# Patient Record
Sex: MALE | Race: WHITE | NOT HISPANIC OR LATINO | Employment: FULL TIME | URBAN - METROPOLITAN AREA
[De-identification: names, ages, dates, MRNs, and addresses within clinical notes are randomized per-mention and may not be internally consistent; named-entity substitution may affect disease eponyms.]

---

## 2017-03-07 ENCOUNTER — TELEPHONE (OUTPATIENT)
Dept: CARDIOLOGY | Facility: CLINIC | Age: 52
End: 2017-03-07

## 2018-03-27 ENCOUNTER — DOCUMENTATION (OUTPATIENT)
Dept: FAMILY MEDICINE CLINIC | Facility: CLINIC | Age: 53
End: 2018-03-27

## 2018-10-02 ENCOUNTER — CLINICAL SUPPORT (OUTPATIENT)
Dept: FAMILY MEDICINE CLINIC | Facility: CLINIC | Age: 53
End: 2018-10-02

## 2018-10-02 DIAGNOSIS — Z23 FLU VACCINE NEED: Primary | ICD-10-CM

## 2018-10-02 DIAGNOSIS — Z23 IMMUNIZATION DUE: ICD-10-CM

## 2018-10-02 PROCEDURE — 90471 IMMUNIZATION ADMIN: CPT | Performed by: FAMILY MEDICINE

## 2018-10-02 PROCEDURE — 90686 IIV4 VACC NO PRSV 0.5 ML IM: CPT | Performed by: FAMILY MEDICINE

## 2018-10-10 ENCOUNTER — TELEPHONE (OUTPATIENT)
Dept: GASTROENTEROLOGY | Facility: CLINIC | Age: 53
End: 2018-10-10

## 2018-10-10 NOTE — TELEPHONE ENCOUNTER
Memo, MD Dez See Carla C Dr Marquess needs his screening colonosocopy and wants it on 11/5/18 a mon i've already got 7 colons on but would like to squeeze him in . Would you contact EP and see if I can start at 7:30?   Looks like all of  info is up to date form this year. Just let me know and feel free to reach out to Dr Gan.

## 2018-10-10 NOTE — TELEPHONE ENCOUNTER
SCHEDULED AT [place] ON 11/05/2018 AT 7:30AM - ARRIVE 6:30AM.  EMAILED INSTRUCTIONS [email or web address] TODAY.

## 2018-10-31 ENCOUNTER — PREP FOR SURGERY (OUTPATIENT)
Dept: OTHER | Facility: HOSPITAL | Age: 53
End: 2018-10-31

## 2018-10-31 ENCOUNTER — DOCUMENTATION (OUTPATIENT)
Dept: FAMILY MEDICINE CLINIC | Facility: CLINIC | Age: 53
End: 2018-10-31

## 2018-10-31 DIAGNOSIS — D12.6 ADENOMATOUS POLYP OF COLON, UNSPECIFIED PART OF COLON: Primary | ICD-10-CM

## 2018-11-05 ENCOUNTER — OUTSIDE FACILITY SERVICE (OUTPATIENT)
Dept: GASTROENTEROLOGY | Facility: CLINIC | Age: 53
End: 2018-11-05

## 2018-11-05 PROCEDURE — 45380 COLONOSCOPY AND BIOPSY: CPT | Performed by: INTERNAL MEDICINE

## 2018-11-05 PROCEDURE — 88305 TISSUE EXAM BY PATHOLOGIST: CPT | Performed by: INTERNAL MEDICINE

## 2018-11-06 ENCOUNTER — LAB REQUISITION (OUTPATIENT)
Dept: LAB | Facility: HOSPITAL | Age: 53
End: 2018-11-06

## 2018-11-06 DIAGNOSIS — D12.6 BENIGN NEOPLASM OF COLON: ICD-10-CM

## 2018-11-07 LAB
CYTO UR: NORMAL
LAB AP CASE REPORT: NORMAL
LAB AP CLINICAL INFORMATION: NORMAL
PATH REPORT.FINAL DX SPEC: NORMAL
PATH REPORT.GROSS SPEC: NORMAL

## 2019-07-20 ENCOUNTER — HOSPITAL ENCOUNTER (INPATIENT)
Facility: HOSPITAL | Age: 54
LOS: 1 days | Discharge: HOME | End: 2019-07-22
Attending: EMERGENCY MEDICINE | Admitting: SURGERY

## 2019-07-20 ENCOUNTER — APPOINTMENT (OUTPATIENT)
Dept: GENERAL RADIOLOGY | Facility: HOSPITAL | Age: 54
End: 2019-07-20

## 2019-07-20 ENCOUNTER — APPOINTMENT (OUTPATIENT)
Dept: ULTRASOUND IMAGING | Facility: HOSPITAL | Age: 54
End: 2019-07-20

## 2019-07-20 ENCOUNTER — APPOINTMENT (OUTPATIENT)
Dept: CT IMAGING | Facility: HOSPITAL | Age: 54
End: 2019-07-20

## 2019-07-20 DIAGNOSIS — K81.9 CHOLECYSTITIS: Primary | ICD-10-CM

## 2019-07-20 DIAGNOSIS — K80.20 CHOLELITHIASIS: ICD-10-CM

## 2019-07-20 LAB
ALBUMIN SERPL-MCNC: 4.5 G/DL (ref 3.5–5.2)
ALBUMIN/GLOB SERPL: 2 G/DL
ALP SERPL-CCNC: 66 U/L (ref 39–117)
ALT SERPL W P-5'-P-CCNC: 16 U/L (ref 1–41)
ANION GAP SERPL CALCULATED.3IONS-SCNC: 11.3 MMOL/L (ref 5–15)
AST SERPL-CCNC: 18 U/L (ref 1–40)
BASOPHILS # BLD AUTO: 0.04 10*3/MM3 (ref 0–0.2)
BASOPHILS NFR BLD AUTO: 0.4 % (ref 0–1.5)
BILIRUB SERPL-MCNC: 0.6 MG/DL (ref 0.2–1.2)
BUN BLD-MCNC: 12 MG/DL (ref 6–20)
BUN/CREAT SERPL: 8.9 (ref 7–25)
CALCIUM SPEC-SCNC: 9.1 MG/DL (ref 8.6–10.5)
CHLORIDE SERPL-SCNC: 100 MMOL/L (ref 98–107)
CO2 SERPL-SCNC: 26.7 MMOL/L (ref 22–29)
CREAT BLD-MCNC: 1.35 MG/DL (ref 0.76–1.27)
DEPRECATED RDW RBC AUTO: 37.6 FL (ref 37–54)
EOSINOPHIL # BLD AUTO: 0.16 10*3/MM3 (ref 0–0.4)
EOSINOPHIL NFR BLD AUTO: 1.7 % (ref 0.3–6.2)
ERYTHROCYTE [DISTWIDTH] IN BLOOD BY AUTOMATED COUNT: 12 % (ref 12.3–15.4)
GFR SERPL CREATININE-BSD FRML MDRD: 55 ML/MIN/1.73
GLOBULIN UR ELPH-MCNC: 2.3 GM/DL
GLUCOSE BLD-MCNC: 146 MG/DL (ref 65–99)
HCT VFR BLD AUTO: 44.3 % (ref 37.5–51)
HGB BLD-MCNC: 15.4 G/DL (ref 13–17.7)
IMM GRANULOCYTES # BLD AUTO: 0.02 10*3/MM3 (ref 0–0.05)
IMM GRANULOCYTES NFR BLD AUTO: 0.2 % (ref 0–0.5)
INR PPP: 1.05 (ref 0.9–1.1)
LIPASE SERPL-CCNC: 30 U/L (ref 13–60)
LYMPHOCYTES # BLD AUTO: 3.63 10*3/MM3 (ref 0.7–3.1)
LYMPHOCYTES NFR BLD AUTO: 38.3 % (ref 19.6–45.3)
MAGNESIUM SERPL-MCNC: 2.3 MG/DL (ref 1.6–2.6)
MCH RBC QN AUTO: 29.7 PG (ref 26.6–33)
MCHC RBC AUTO-ENTMCNC: 34.8 G/DL (ref 31.5–35.7)
MCV RBC AUTO: 85.5 FL (ref 79–97)
MONOCYTES # BLD AUTO: 0.7 10*3/MM3 (ref 0.1–0.9)
MONOCYTES NFR BLD AUTO: 7.4 % (ref 5–12)
NEUTROPHILS # BLD AUTO: 4.93 10*3/MM3 (ref 1.7–7)
NEUTROPHILS NFR BLD AUTO: 52 % (ref 42.7–76)
NRBC BLD AUTO-RTO: 0 /100 WBC (ref 0–0.2)
PLATELET # BLD AUTO: 206 10*3/MM3 (ref 140–450)
PMV BLD AUTO: 9.3 FL (ref 6–12)
POTASSIUM BLD-SCNC: 3.5 MMOL/L (ref 3.5–5.2)
PROT SERPL-MCNC: 6.8 G/DL (ref 6–8.5)
PROTHROMBIN TIME: 13.4 SECONDS (ref 11.7–14.2)
RBC # BLD AUTO: 5.18 10*6/MM3 (ref 4.14–5.8)
SODIUM BLD-SCNC: 138 MMOL/L (ref 136–145)
TROPONIN T SERPL-MCNC: <0.01 NG/ML (ref 0–0.03)
WBC NRBC COR # BLD: 9.48 10*3/MM3 (ref 3.4–10.8)

## 2019-07-20 PROCEDURE — 84484 ASSAY OF TROPONIN QUANT: CPT | Performed by: EMERGENCY MEDICINE

## 2019-07-20 PROCEDURE — 83690 ASSAY OF LIPASE: CPT | Performed by: EMERGENCY MEDICINE

## 2019-07-20 PROCEDURE — 83735 ASSAY OF MAGNESIUM: CPT | Performed by: EMERGENCY MEDICINE

## 2019-07-20 PROCEDURE — 80053 COMPREHEN METABOLIC PANEL: CPT | Performed by: EMERGENCY MEDICINE

## 2019-07-20 PROCEDURE — 25010000002 ONDANSETRON PER 1 MG: Performed by: EMERGENCY MEDICINE

## 2019-07-20 PROCEDURE — 99284 EMERGENCY DEPT VISIT MOD MDM: CPT

## 2019-07-20 PROCEDURE — 25010000002 HYDROMORPHONE 1 MG/ML SOLUTION: Performed by: EMERGENCY MEDICINE

## 2019-07-20 PROCEDURE — 93005 ELECTROCARDIOGRAM TRACING: CPT | Performed by: EMERGENCY MEDICINE

## 2019-07-20 PROCEDURE — 71045 X-RAY EXAM CHEST 1 VIEW: CPT

## 2019-07-20 PROCEDURE — 85025 COMPLETE CBC W/AUTO DIFF WBC: CPT | Performed by: EMERGENCY MEDICINE

## 2019-07-20 PROCEDURE — 25010000002 PIPERACILLIN SOD-TAZOBACTAM PER 1 G: Performed by: EMERGENCY MEDICINE

## 2019-07-20 PROCEDURE — 0 IOPAMIDOL PER 1 ML: Performed by: EMERGENCY MEDICINE

## 2019-07-20 PROCEDURE — 74177 CT ABD & PELVIS W/CONTRAST: CPT

## 2019-07-20 PROCEDURE — 93005 ELECTROCARDIOGRAM TRACING: CPT

## 2019-07-20 PROCEDURE — 71275 CT ANGIOGRAPHY CHEST: CPT

## 2019-07-20 PROCEDURE — 25010000002 HYDROMORPHONE PER 4 MG: Performed by: EMERGENCY MEDICINE

## 2019-07-20 PROCEDURE — 93010 ELECTROCARDIOGRAM REPORT: CPT | Performed by: INTERNAL MEDICINE

## 2019-07-20 PROCEDURE — 85610 PROTHROMBIN TIME: CPT | Performed by: EMERGENCY MEDICINE

## 2019-07-20 PROCEDURE — 76705 ECHO EXAM OF ABDOMEN: CPT

## 2019-07-20 RX ORDER — HYDROMORPHONE HYDROCHLORIDE 1 MG/ML
0.5 INJECTION, SOLUTION INTRAMUSCULAR; INTRAVENOUS; SUBCUTANEOUS ONCE
Status: COMPLETED | OUTPATIENT
Start: 2019-07-20 | End: 2019-07-20

## 2019-07-20 RX ORDER — ALUMINA, MAGNESIA, AND SIMETHICONE 2400; 2400; 240 MG/30ML; MG/30ML; MG/30ML
15 SUSPENSION ORAL ONCE
Status: COMPLETED | OUTPATIENT
Start: 2019-07-20 | End: 2019-07-20

## 2019-07-20 RX ORDER — LIDOCAINE HYDROCHLORIDE 20 MG/ML
15 SOLUTION OROPHARYNGEAL ONCE
Status: COMPLETED | OUTPATIENT
Start: 2019-07-20 | End: 2019-07-20

## 2019-07-20 RX ORDER — FAMOTIDINE 10 MG/ML
20 INJECTION, SOLUTION INTRAVENOUS ONCE
Status: COMPLETED | OUTPATIENT
Start: 2019-07-20 | End: 2019-07-20

## 2019-07-20 RX ORDER — SODIUM CHLORIDE 0.9 % (FLUSH) 0.9 %
10 SYRINGE (ML) INJECTION AS NEEDED
Status: DISCONTINUED | OUTPATIENT
Start: 2019-07-20 | End: 2019-07-22 | Stop reason: HOSPADM

## 2019-07-20 RX ORDER — ONDANSETRON 2 MG/ML
4 INJECTION INTRAMUSCULAR; INTRAVENOUS ONCE
Status: COMPLETED | OUTPATIENT
Start: 2019-07-20 | End: 2019-07-20

## 2019-07-20 RX ADMIN — TAZOBACTAM SODIUM AND PIPERACILLIN SODIUM 3.38 G: 375; 3 INJECTION, SOLUTION INTRAVENOUS at 23:54

## 2019-07-20 RX ADMIN — HYDROMORPHONE HYDROCHLORIDE 1 MG: 1 INJECTION, SOLUTION INTRAMUSCULAR; INTRAVENOUS; SUBCUTANEOUS at 23:50

## 2019-07-20 RX ADMIN — SODIUM CHLORIDE 1000 ML: 9 INJECTION, SOLUTION INTRAVENOUS at 21:57

## 2019-07-20 RX ADMIN — LIDOCAINE HYDROCHLORIDE 15 ML: 20 SOLUTION ORAL; TOPICAL at 21:58

## 2019-07-20 RX ADMIN — FAMOTIDINE 20 MG: 10 INJECTION INTRAVENOUS at 21:57

## 2019-07-20 RX ADMIN — IOPAMIDOL 95 ML: 755 INJECTION, SOLUTION INTRAVENOUS at 22:45

## 2019-07-20 RX ADMIN — ALUMINUM HYDROXIDE, MAGNESIUM HYDROXIDE, AND DIMETHICONE 15 ML: 400; 400; 40 SUSPENSION ORAL at 21:58

## 2019-07-20 RX ADMIN — ONDANSETRON HYDROCHLORIDE 4 MG: 2 SOLUTION INTRAMUSCULAR; INTRAVENOUS at 22:26

## 2019-07-20 RX ADMIN — HYDROMORPHONE HYDROCHLORIDE 0.5 MG: 1 INJECTION, SOLUTION INTRAMUSCULAR; INTRAVENOUS; SUBCUTANEOUS at 22:26

## 2019-07-21 ENCOUNTER — APPOINTMENT (OUTPATIENT)
Dept: GENERAL RADIOLOGY | Facility: HOSPITAL | Age: 54
End: 2019-07-21

## 2019-07-21 ENCOUNTER — ANESTHESIA EVENT (OUTPATIENT)
Dept: PERIOP | Facility: HOSPITAL | Age: 54
End: 2019-07-21

## 2019-07-21 ENCOUNTER — ANESTHESIA (OUTPATIENT)
Dept: PERIOP | Facility: HOSPITAL | Age: 54
End: 2019-07-21

## 2019-07-21 PROBLEM — K81.9 CHOLECYSTITIS: Status: ACTIVE | Noted: 2019-07-21

## 2019-07-21 PROBLEM — K81.9 CHOLECYSTITIS: Status: RESOLVED | Noted: 2019-07-21 | Resolved: 2019-07-21

## 2019-07-21 LAB
ALBUMIN SERPL-MCNC: 4.1 G/DL (ref 3.5–5.2)
ALBUMIN/GLOB SERPL: 1.7 G/DL
ALP SERPL-CCNC: 60 U/L (ref 39–117)
ALT SERPL W P-5'-P-CCNC: 17 U/L (ref 1–41)
ANION GAP SERPL CALCULATED.3IONS-SCNC: 9.8 MMOL/L (ref 5–15)
AST SERPL-CCNC: 16 U/L (ref 1–40)
BASOPHILS # BLD AUTO: 0.01 10*3/MM3 (ref 0–0.2)
BASOPHILS NFR BLD AUTO: 0.1 % (ref 0–1.5)
BILIRUB SERPL-MCNC: 0.6 MG/DL (ref 0.2–1.2)
BILIRUB UR QL STRIP: NEGATIVE
BUN BLD-MCNC: 12 MG/DL (ref 6–20)
BUN/CREAT SERPL: 10.6 (ref 7–25)
CALCIUM SPEC-SCNC: 9.2 MG/DL (ref 8.6–10.5)
CHLORIDE SERPL-SCNC: 107 MMOL/L (ref 98–107)
CLARITY UR: CLEAR
CO2 SERPL-SCNC: 25.2 MMOL/L (ref 22–29)
COLOR UR: YELLOW
CREAT BLD-MCNC: 1.13 MG/DL (ref 0.76–1.27)
DEPRECATED RDW RBC AUTO: 37.7 FL (ref 37–54)
EOSINOPHIL # BLD AUTO: 0 10*3/MM3 (ref 0–0.4)
EOSINOPHIL NFR BLD AUTO: 0 % (ref 0.3–6.2)
ERYTHROCYTE [DISTWIDTH] IN BLOOD BY AUTOMATED COUNT: 11.8 % (ref 12.3–15.4)
GFR SERPL CREATININE-BSD FRML MDRD: 67 ML/MIN/1.73
GLOBULIN UR ELPH-MCNC: 2.4 GM/DL
GLUCOSE BLD-MCNC: 153 MG/DL (ref 65–99)
GLUCOSE UR STRIP-MCNC: NEGATIVE MG/DL
HCT VFR BLD AUTO: 41.9 % (ref 37.5–51)
HGB BLD-MCNC: 14.1 G/DL (ref 13–17.7)
HGB UR QL STRIP.AUTO: NEGATIVE
IMM GRANULOCYTES # BLD AUTO: 0.02 10*3/MM3 (ref 0–0.05)
IMM GRANULOCYTES NFR BLD AUTO: 0.2 % (ref 0–0.5)
KETONES UR QL STRIP: NEGATIVE
LEUKOCYTE ESTERASE UR QL STRIP.AUTO: NEGATIVE
LYMPHOCYTES # BLD AUTO: 0.76 10*3/MM3 (ref 0.7–3.1)
LYMPHOCYTES NFR BLD AUTO: 8.4 % (ref 19.6–45.3)
MCH RBC QN AUTO: 29.4 PG (ref 26.6–33)
MCHC RBC AUTO-ENTMCNC: 33.7 G/DL (ref 31.5–35.7)
MCV RBC AUTO: 87.5 FL (ref 79–97)
MONOCYTES # BLD AUTO: 0.41 10*3/MM3 (ref 0.1–0.9)
MONOCYTES NFR BLD AUTO: 4.6 % (ref 5–12)
NEUTROPHILS # BLD AUTO: 7.8 10*3/MM3 (ref 1.7–7)
NEUTROPHILS NFR BLD AUTO: 86.7 % (ref 42.7–76)
NITRITE UR QL STRIP: NEGATIVE
NRBC BLD AUTO-RTO: 0 /100 WBC (ref 0–0.2)
PH UR STRIP.AUTO: 8 [PH] (ref 5–8)
PLATELET # BLD AUTO: 163 10*3/MM3 (ref 140–450)
PMV BLD AUTO: 9.7 FL (ref 6–12)
POTASSIUM BLD-SCNC: 5 MMOL/L (ref 3.5–5.2)
PROT SERPL-MCNC: 6.5 G/DL (ref 6–8.5)
PROT UR QL STRIP: NEGATIVE
RBC # BLD AUTO: 4.79 10*6/MM3 (ref 4.14–5.8)
SODIUM BLD-SCNC: 142 MMOL/L (ref 136–145)
SP GR UR STRIP: >=1.03 (ref 1–1.03)
UROBILINOGEN UR QL STRIP: NORMAL
WBC NRBC COR # BLD: 9 10*3/MM3 (ref 3.4–10.8)

## 2019-07-21 PROCEDURE — 74300 X-RAY BILE DUCTS/PANCREAS: CPT

## 2019-07-21 PROCEDURE — 85025 COMPLETE CBC W/AUTO DIFF WBC: CPT | Performed by: SURGERY

## 2019-07-21 PROCEDURE — 25010000002 FENTANYL CITRATE (PF) 100 MCG/2ML SOLUTION: Performed by: ANESTHESIOLOGY

## 2019-07-21 PROCEDURE — 25010000002 SUCCINYLCHOLINE PER 20 MG: Performed by: ANESTHESIOLOGY

## 2019-07-21 PROCEDURE — 47563 LAPARO CHOLECYSTECTOMY/GRAPH: CPT | Performed by: PHYSICIAN ASSISTANT

## 2019-07-21 PROCEDURE — 25010000002 PROPOFOL 10 MG/ML EMULSION: Performed by: ANESTHESIOLOGY

## 2019-07-21 PROCEDURE — 47563 LAPARO CHOLECYSTECTOMY/GRAPH: CPT | Performed by: SURGERY

## 2019-07-21 PROCEDURE — BF101ZZ FLUOROSCOPY OF BILE DUCTS USING LOW OSMOLAR CONTRAST: ICD-10-PCS | Performed by: SURGERY

## 2019-07-21 PROCEDURE — 25010000002 ONDANSETRON PER 1 MG

## 2019-07-21 PROCEDURE — 25010000002 PIPERACILLIN SOD-TAZOBACTAM PER 1 G: Performed by: SURGERY

## 2019-07-21 PROCEDURE — 88304 TISSUE EXAM BY PATHOLOGIST: CPT | Performed by: SURGERY

## 2019-07-21 PROCEDURE — 25010000002 PROMETHAZINE PER 50 MG: Performed by: SURGERY

## 2019-07-21 PROCEDURE — 0FT44ZZ RESECTION OF GALLBLADDER, PERCUTANEOUS ENDOSCOPIC APPROACH: ICD-10-PCS | Performed by: SURGERY

## 2019-07-21 PROCEDURE — 80053 COMPREHEN METABOLIC PANEL: CPT | Performed by: SURGERY

## 2019-07-21 PROCEDURE — 25010000002 MIDAZOLAM PER 1 MG: Performed by: ANESTHESIOLOGY

## 2019-07-21 PROCEDURE — 81003 URINALYSIS AUTO W/O SCOPE: CPT | Performed by: EMERGENCY MEDICINE

## 2019-07-21 PROCEDURE — 99222 1ST HOSP IP/OBS MODERATE 55: CPT | Performed by: SURGERY

## 2019-07-21 DEVICE — CLIP LIGAT VASC HORIZON TI MD/LG GRN 6CT: Type: IMPLANTABLE DEVICE | Site: ABDOMEN | Status: FUNCTIONAL

## 2019-07-21 DEVICE — CLIP LIG HEMOLOK PA 6CT MD/LG GRN: Type: IMPLANTABLE DEVICE | Site: ABDOMEN | Status: FUNCTIONAL

## 2019-07-21 RX ORDER — DIPHENHYDRAMINE HYDROCHLORIDE 50 MG/ML
12.5 INJECTION INTRAMUSCULAR; INTRAVENOUS
Status: DISCONTINUED | OUTPATIENT
Start: 2019-07-21 | End: 2019-07-21 | Stop reason: HOSPADM

## 2019-07-21 RX ORDER — LABETALOL HYDROCHLORIDE 5 MG/ML
5 INJECTION, SOLUTION INTRAVENOUS
Status: DISCONTINUED | OUTPATIENT
Start: 2019-07-21 | End: 2019-07-21 | Stop reason: HOSPADM

## 2019-07-21 RX ORDER — MIDAZOLAM HYDROCHLORIDE 1 MG/ML
INJECTION INTRAMUSCULAR; INTRAVENOUS
Status: COMPLETED
Start: 2019-07-21 | End: 2019-07-21

## 2019-07-21 RX ORDER — NALOXONE HCL 0.4 MG/ML
0.2 VIAL (ML) INJECTION AS NEEDED
Status: DISCONTINUED | OUTPATIENT
Start: 2019-07-21 | End: 2019-07-21 | Stop reason: HOSPADM

## 2019-07-21 RX ORDER — GABAPENTIN 300 MG/1
600 CAPSULE ORAL ONCE
Status: COMPLETED | OUTPATIENT
Start: 2019-07-21 | End: 2019-07-21

## 2019-07-21 RX ORDER — PROPOFOL 10 MG/ML
VIAL (ML) INTRAVENOUS AS NEEDED
Status: DISCONTINUED | OUTPATIENT
Start: 2019-07-21 | End: 2019-07-21 | Stop reason: SURG

## 2019-07-21 RX ORDER — SODIUM CHLORIDE, SODIUM LACTATE, POTASSIUM CHLORIDE, CALCIUM CHLORIDE 600; 310; 30; 20 MG/100ML; MG/100ML; MG/100ML; MG/100ML
9 INJECTION, SOLUTION INTRAVENOUS CONTINUOUS
Status: DISCONTINUED | OUTPATIENT
Start: 2019-07-21 | End: 2019-07-22 | Stop reason: HOSPADM

## 2019-07-21 RX ORDER — HYDROCODONE BITARTRATE AND ACETAMINOPHEN 7.5; 325 MG/1; MG/1
1 TABLET ORAL ONCE AS NEEDED
Status: DISCONTINUED | OUTPATIENT
Start: 2019-07-21 | End: 2019-07-21 | Stop reason: HOSPADM

## 2019-07-21 RX ORDER — PROMETHAZINE HYDROCHLORIDE 25 MG/ML
6.25 INJECTION, SOLUTION INTRAMUSCULAR; INTRAVENOUS
Status: DISCONTINUED | OUTPATIENT
Start: 2019-07-21 | End: 2019-07-21 | Stop reason: HOSPADM

## 2019-07-21 RX ORDER — PROMETHAZINE HYDROCHLORIDE 25 MG/ML
12.5 INJECTION, SOLUTION INTRAMUSCULAR; INTRAVENOUS EVERY 6 HOURS PRN
Status: DISCONTINUED | OUTPATIENT
Start: 2019-07-21 | End: 2019-07-22 | Stop reason: HOSPADM

## 2019-07-21 RX ORDER — MIDAZOLAM HYDROCHLORIDE 1 MG/ML
1 INJECTION INTRAMUSCULAR; INTRAVENOUS
Status: DISCONTINUED | OUTPATIENT
Start: 2019-07-21 | End: 2019-07-21 | Stop reason: HOSPADM

## 2019-07-21 RX ORDER — FENTANYL CITRATE 50 UG/ML
50 INJECTION, SOLUTION INTRAMUSCULAR; INTRAVENOUS
Status: DISCONTINUED | OUTPATIENT
Start: 2019-07-21 | End: 2019-07-21 | Stop reason: HOSPADM

## 2019-07-21 RX ORDER — SODIUM CHLORIDE 9 MG/ML
INJECTION, SOLUTION INTRAVENOUS AS NEEDED
Status: DISCONTINUED | OUTPATIENT
Start: 2019-07-21 | End: 2019-07-21 | Stop reason: HOSPADM

## 2019-07-21 RX ORDER — PROMETHAZINE HYDROCHLORIDE 25 MG/1
25 TABLET ORAL ONCE AS NEEDED
Status: DISCONTINUED | OUTPATIENT
Start: 2019-07-21 | End: 2019-07-21 | Stop reason: HOSPADM

## 2019-07-21 RX ORDER — ONDANSETRON 2 MG/ML
4 INJECTION INTRAMUSCULAR; INTRAVENOUS ONCE
Status: COMPLETED | OUTPATIENT
Start: 2019-07-21 | End: 2019-07-21

## 2019-07-21 RX ORDER — NALOXONE HCL 0.4 MG/ML
0.4 VIAL (ML) INJECTION
Status: DISCONTINUED | OUTPATIENT
Start: 2019-07-21 | End: 2019-07-22 | Stop reason: HOSPADM

## 2019-07-21 RX ORDER — FAMOTIDINE 10 MG/ML
20 INJECTION, SOLUTION INTRAVENOUS ONCE
Status: COMPLETED | OUTPATIENT
Start: 2019-07-21 | End: 2019-07-21

## 2019-07-21 RX ORDER — ONDANSETRON 2 MG/ML
4 INJECTION INTRAMUSCULAR; INTRAVENOUS ONCE AS NEEDED
Status: DISCONTINUED | OUTPATIENT
Start: 2019-07-21 | End: 2019-07-21 | Stop reason: HOSPADM

## 2019-07-21 RX ORDER — PROMETHAZINE HYDROCHLORIDE 12.5 MG/1
12.5 TABLET ORAL EVERY 6 HOURS PRN
Status: DISCONTINUED | OUTPATIENT
Start: 2019-07-21 | End: 2019-07-22 | Stop reason: HOSPADM

## 2019-07-21 RX ORDER — DIPHENHYDRAMINE HCL 25 MG
25 CAPSULE ORAL
Status: DISCONTINUED | OUTPATIENT
Start: 2019-07-21 | End: 2019-07-21 | Stop reason: HOSPADM

## 2019-07-21 RX ORDER — ACETAMINOPHEN 500 MG
1000 TABLET ORAL ONCE
Status: COMPLETED | OUTPATIENT
Start: 2019-07-21 | End: 2019-07-21

## 2019-07-21 RX ORDER — FENTANYL CITRATE 50 UG/ML
INJECTION, SOLUTION INTRAMUSCULAR; INTRAVENOUS AS NEEDED
Status: DISCONTINUED | OUTPATIENT
Start: 2019-07-21 | End: 2019-07-21 | Stop reason: SURG

## 2019-07-21 RX ORDER — LIDOCAINE HYDROCHLORIDE 10 MG/ML
0.5 INJECTION, SOLUTION EPIDURAL; INFILTRATION; INTRACAUDAL; PERINEURAL ONCE AS NEEDED
Status: DISCONTINUED | OUTPATIENT
Start: 2019-07-21 | End: 2019-07-21 | Stop reason: HOSPADM

## 2019-07-21 RX ORDER — PROMETHAZINE HYDROCHLORIDE 25 MG/1
25 SUPPOSITORY RECTAL ONCE AS NEEDED
Status: DISCONTINUED | OUTPATIENT
Start: 2019-07-21 | End: 2019-07-21 | Stop reason: HOSPADM

## 2019-07-21 RX ORDER — SODIUM CHLORIDE 0.9 % (FLUSH) 0.9 %
3-10 SYRINGE (ML) INJECTION AS NEEDED
Status: DISCONTINUED | OUTPATIENT
Start: 2019-07-21 | End: 2019-07-22 | Stop reason: HOSPADM

## 2019-07-21 RX ORDER — SODIUM CHLORIDE 0.9 % (FLUSH) 0.9 %
1-10 SYRINGE (ML) INJECTION AS NEEDED
Status: DISCONTINUED | OUTPATIENT
Start: 2019-07-21 | End: 2019-07-21 | Stop reason: HOSPADM

## 2019-07-21 RX ORDER — PROMETHAZINE HYDROCHLORIDE 25 MG/ML
12.5 INJECTION, SOLUTION INTRAMUSCULAR; INTRAVENOUS ONCE AS NEEDED
Status: DISCONTINUED | OUTPATIENT
Start: 2019-07-21 | End: 2019-07-21 | Stop reason: HOSPADM

## 2019-07-21 RX ORDER — BUPIVACAINE HYDROCHLORIDE AND EPINEPHRINE 5; 5 MG/ML; UG/ML
INJECTION, SOLUTION PERINEURAL AS NEEDED
Status: DISCONTINUED | OUTPATIENT
Start: 2019-07-21 | End: 2019-07-21 | Stop reason: HOSPADM

## 2019-07-21 RX ORDER — SODIUM CHLORIDE 0.9 % (FLUSH) 0.9 %
3 SYRINGE (ML) INJECTION EVERY 12 HOURS SCHEDULED
Status: DISCONTINUED | OUTPATIENT
Start: 2019-07-21 | End: 2019-07-22 | Stop reason: HOSPADM

## 2019-07-21 RX ORDER — FENTANYL CITRATE 50 UG/ML
INJECTION, SOLUTION INTRAMUSCULAR; INTRAVENOUS
Status: COMPLETED
Start: 2019-07-21 | End: 2019-07-21

## 2019-07-21 RX ORDER — MORPHINE SULFATE 2 MG/ML
1 INJECTION, SOLUTION INTRAMUSCULAR; INTRAVENOUS EVERY 4 HOURS PRN
Status: DISCONTINUED | OUTPATIENT
Start: 2019-07-21 | End: 2019-07-22 | Stop reason: HOSPADM

## 2019-07-21 RX ORDER — FLUMAZENIL 0.1 MG/ML
0.2 INJECTION INTRAVENOUS AS NEEDED
Status: DISCONTINUED | OUTPATIENT
Start: 2019-07-21 | End: 2019-07-21 | Stop reason: HOSPADM

## 2019-07-21 RX ORDER — PROMETHAZINE HYDROCHLORIDE 25 MG/1
12.5 SUPPOSITORY RECTAL EVERY 6 HOURS PRN
Status: DISCONTINUED | OUTPATIENT
Start: 2019-07-21 | End: 2019-07-22 | Stop reason: HOSPADM

## 2019-07-21 RX ORDER — ACETAMINOPHEN 325 MG/1
650 TABLET ORAL ONCE AS NEEDED
Status: DISCONTINUED | OUTPATIENT
Start: 2019-07-21 | End: 2019-07-21 | Stop reason: HOSPADM

## 2019-07-21 RX ORDER — ACETAMINOPHEN 325 MG/1
650 TABLET ORAL EVERY 4 HOURS PRN
Status: DISCONTINUED | OUTPATIENT
Start: 2019-07-21 | End: 2019-07-22 | Stop reason: HOSPADM

## 2019-07-21 RX ORDER — HYDROMORPHONE HYDROCHLORIDE 1 MG/ML
0.5 INJECTION, SOLUTION INTRAMUSCULAR; INTRAVENOUS; SUBCUTANEOUS
Status: DISCONTINUED | OUTPATIENT
Start: 2019-07-21 | End: 2019-07-21 | Stop reason: HOSPADM

## 2019-07-21 RX ORDER — ONDANSETRON 2 MG/ML
INJECTION INTRAMUSCULAR; INTRAVENOUS
Status: COMPLETED
Start: 2019-07-21 | End: 2019-07-21

## 2019-07-21 RX ORDER — HYDROMORPHONE HYDROCHLORIDE 1 MG/ML
0.5 INJECTION, SOLUTION INTRAMUSCULAR; INTRAVENOUS; SUBCUTANEOUS
Status: DISCONTINUED | OUTPATIENT
Start: 2019-07-21 | End: 2019-07-22 | Stop reason: HOSPADM

## 2019-07-21 RX ORDER — SUCCINYLCHOLINE CHLORIDE 20 MG/ML
INJECTION INTRAMUSCULAR; INTRAVENOUS AS NEEDED
Status: DISCONTINUED | OUTPATIENT
Start: 2019-07-21 | End: 2019-07-21 | Stop reason: SURG

## 2019-07-21 RX ORDER — OXYCODONE AND ACETAMINOPHEN 7.5; 325 MG/1; MG/1
1 TABLET ORAL ONCE AS NEEDED
Status: DISCONTINUED | OUTPATIENT
Start: 2019-07-21 | End: 2019-07-21 | Stop reason: HOSPADM

## 2019-07-21 RX ORDER — HYDRALAZINE HYDROCHLORIDE 20 MG/ML
5 INJECTION INTRAMUSCULAR; INTRAVENOUS
Status: DISCONTINUED | OUTPATIENT
Start: 2019-07-21 | End: 2019-07-21 | Stop reason: HOSPADM

## 2019-07-21 RX ORDER — EPHEDRINE SULFATE 50 MG/ML
INJECTION, SOLUTION INTRAVENOUS AS NEEDED
Status: DISCONTINUED | OUTPATIENT
Start: 2019-07-21 | End: 2019-07-21 | Stop reason: SURG

## 2019-07-21 RX ORDER — MIDAZOLAM HYDROCHLORIDE 1 MG/ML
2 INJECTION INTRAMUSCULAR; INTRAVENOUS
Status: DISCONTINUED | OUTPATIENT
Start: 2019-07-21 | End: 2019-07-21 | Stop reason: HOSPADM

## 2019-07-21 RX ORDER — ROCURONIUM BROMIDE 10 MG/ML
INJECTION, SOLUTION INTRAVENOUS AS NEEDED
Status: DISCONTINUED | OUTPATIENT
Start: 2019-07-21 | End: 2019-07-21 | Stop reason: SURG

## 2019-07-21 RX ORDER — EPHEDRINE SULFATE 50 MG/ML
5 INJECTION, SOLUTION INTRAVENOUS ONCE AS NEEDED
Status: DISCONTINUED | OUTPATIENT
Start: 2019-07-21 | End: 2019-07-21 | Stop reason: HOSPADM

## 2019-07-21 RX ADMIN — GABAPENTIN 600 MG: 300 CAPSULE ORAL at 11:12

## 2019-07-21 RX ADMIN — FENTANYL CITRATE 250 MCG: 50 INJECTION, SOLUTION INTRAMUSCULAR; INTRAVENOUS at 12:14

## 2019-07-21 RX ADMIN — PROMETHAZINE HYDROCHLORIDE 12.5 MG: 25 INJECTION INTRAMUSCULAR; INTRAVENOUS at 03:28

## 2019-07-21 RX ADMIN — SUCCINYLCHOLINE CHLORIDE 200 MG: 20 INJECTION, SOLUTION INTRAMUSCULAR; INTRAVENOUS; PARENTERAL at 12:15

## 2019-07-21 RX ADMIN — SODIUM CHLORIDE, PRESERVATIVE FREE 3 ML: 5 INJECTION INTRAVENOUS at 20:06

## 2019-07-21 RX ADMIN — ONDANSETRON 4 MG: 2 INJECTION INTRAMUSCULAR; INTRAVENOUS at 00:30

## 2019-07-21 RX ADMIN — ROCURONIUM BROMIDE 5 MG: 10 INJECTION INTRAVENOUS at 12:13

## 2019-07-21 RX ADMIN — ROCURONIUM BROMIDE 35 MG: 10 INJECTION INTRAVENOUS at 12:20

## 2019-07-21 RX ADMIN — SUGAMMADEX 400 MG: 100 INJECTION, SOLUTION INTRAVENOUS at 13:12

## 2019-07-21 RX ADMIN — ONDANSETRON HYDROCHLORIDE 4 MG: 2 SOLUTION INTRAMUSCULAR; INTRAVENOUS at 00:30

## 2019-07-21 RX ADMIN — EPHEDRINE SULFATE 15 MG: 50 INJECTION INTRAMUSCULAR; INTRAVENOUS; SUBCUTANEOUS at 12:20

## 2019-07-21 RX ADMIN — SODIUM CHLORIDE, POTASSIUM CHLORIDE, SODIUM LACTATE AND CALCIUM CHLORIDE 9 ML/HR: 600; 310; 30; 20 INJECTION, SOLUTION INTRAVENOUS at 11:16

## 2019-07-21 RX ADMIN — Medication 2 MG: at 12:10

## 2019-07-21 RX ADMIN — PROPOFOL 200 MG: 10 INJECTION, EMULSION INTRAVENOUS at 12:14

## 2019-07-21 RX ADMIN — ACETAMINOPHEN 1000 MG: 500 TABLET, FILM COATED ORAL at 11:12

## 2019-07-21 RX ADMIN — SODIUM CHLORIDE, POTASSIUM CHLORIDE, SODIUM LACTATE AND CALCIUM CHLORIDE: 600; 310; 30; 20 INJECTION, SOLUTION INTRAVENOUS at 12:06

## 2019-07-21 RX ADMIN — TAZOBACTAM SODIUM AND PIPERACILLIN SODIUM 3.38 G: 375; 3 INJECTION, SOLUTION INTRAVENOUS at 04:58

## 2019-07-21 RX ADMIN — MIDAZOLAM HYDROCHLORIDE 1 MG: 1 INJECTION, SOLUTION INTRAMUSCULAR; INTRAVENOUS at 11:16

## 2019-07-21 RX ADMIN — FAMOTIDINE 20 MG: 10 INJECTION INTRAVENOUS at 11:15

## 2019-07-21 RX ADMIN — TAZOBACTAM SODIUM AND PIPERACILLIN SODIUM 3.38 G: 375; 3 INJECTION, SOLUTION INTRAVENOUS at 12:41

## 2019-07-21 RX ADMIN — TAZOBACTAM SODIUM AND PIPERACILLIN SODIUM 3.38 G: 375; 3 INJECTION, SOLUTION INTRAVENOUS at 20:06

## 2019-07-21 NOTE — BRIEF OP NOTE
CHOLECYSTECTOMY LAPAROSCOPIC INTRAOPERATIVE CHOLANGIOGRAM  Progress Note    Benjamin Gan Dr.  7/21/2019    Pre-op Diagnosis:   Acute cholecystitis, cholelithiasis       Post-Op Diagnosis Codes:   Same    Procedure/CPT® Codes:      Procedure(s):  CHOLECYSTECTOMY LAPAROSCOPIC INTRAOPERATIVE CHOLANGIOGRAM    Surgeon(s):  Hollis Hernandez MD    Anesthesia: General    Staff:   Circulator: Delia Saunders RN; Kush Dunbar RN  Radiology Technologist: Elana Harding  Scrub Person: Maxine Agosto RN; Mario Cleaning  Assistant: Miguel Davenport PA    Estimated Blood Loss: minimal    Urine Voided: * No values recorded between 7/21/2019 11:58 AM and 7/21/2019  1:15 PM *    Specimens:                Specimens     ID Source Type Tests Collected By Collected At Frozen?      A Gallbladder Tissue · TISSUE PATHOLOGY EXAM   Hollis Hernandez MD 7/21/19 1253 No     Description: gallbladder                Drains:      Findings: Acute cholecystitis, cholelithiasis    Complications: None      Hollis Hernandez MD     Date: 7/21/2019  Time: 1:16 PM

## 2019-07-21 NOTE — PLAN OF CARE
Problem: Patient Care Overview  Goal: Plan of Care Review   07/21/19 6756   Plan of Care Review   Progress improving   OTHER   Outcome Summary Pt s/p Lap Jean today, VSS, afebrile, 4 abd sites glued and ZORA, pt up ambulating around the nursing station wiith family, tolerating diet, anticipate DC in am.   Coping/Psychosocial   Plan of Care Reviewed With patient       Problem: Pain, Acute (Adult)  Goal: Identify Related Risk Factors and Signs and Symptoms  Outcome: Ongoing (interventions implemented as appropriate)

## 2019-07-21 NOTE — PLAN OF CARE
Problem: Patient Care Overview  Goal: Plan of Care Review  Outcome: Ongoing (interventions implemented as appropriate)   07/21/19 2173   Plan of Care Review   Progress improving   OTHER   Outcome Summary Pt admitted from ED with c/o epigastric pain radiating to back and nausea, rating pain now a 1 and vomited 300cc bile, medicated with phenergan with good results, has been npo since admit, plan to have laura surgery this am, up ad saray, vss.        Problem: Nausea/Vomiting (Adult)  Goal: Symptom Relief  Outcome: Ongoing (interventions implemented as appropriate)    Goal: Adequate Hydration  Outcome: Ongoing (interventions implemented as appropriate)      Problem: Pain, Acute (Adult)  Goal: Acceptable Pain Control/Comfort Level  Outcome: Ongoing (interventions implemented as appropriate)

## 2019-07-21 NOTE — ANESTHESIA PROCEDURE NOTES
Airway  Urgency: elective    Date/Time: 7/21/2019 12:17 PM  End Time:7/21/2019 12:17 PM  Airway not difficult    General Information and Staff    Patient location during procedure: OR  Anesthesiologist: Faisal Goodwin MD    Indications and Patient Condition  Indications for airway management: airway protection    Preoxygenated: yes  Mask difficulty assessment: 0 - not attempted    Final Airway Details  Final airway type: endotracheal airway      Successful airway: ETT  Cuffed: yes   Successful intubation technique: direct laryngoscopy  Endotracheal tube insertion site: oral  Blade: Heath  Blade size: 2  ETT size (mm): 8.0  Cormack-Lehane Classification: grade I - full view of glottis  Placement verified by: chest auscultation and capnometry   Number of attempts at approach: 1

## 2019-07-21 NOTE — ED NOTES
Nursing report ED to floor  Benjamin Gan Dr.  54 y.o.  male    HPI (triage note):   Chief Complaint   Patient presents with   • Abdominal Pain       Admitting doctor:   Hollis Hernandez MD    Admitting diagnosis:   The encounter diagnosis was Cholecystitis.    Code status:   Current Code Status     Date Active Code Status Order ID Comments User Context       7/21/2019 00:12 CPR [id number]  Hollis Hernandez MD ED       Questions for Current Code Status     Question Answer Comment    Code Status CPR     Medical Interventions (Level of Support Prior to Arrest) Full     Level Of Support Discussed With Patient           Allergies:   Plavix [clopidogrel bisulfate]    Weight:       07/20/19 2135   Weight: 96.3 kg (212 lb 4.8 oz)       Most recent vitals:   Vitals:    07/20/19 2313 07/20/19 2330 07/20/19 2343 07/21/19 0013   BP: 164/96  155/65 143/90   Pulse:    65   Resp:       Temp:       TempSrc:       SpO2: 99% 96% 96% 90%   Weight:       Height:           Active LDAs/IV Access:   Lines, Drains & Airways    Active LDAs     Name:   Placement date:   Placement time:   Site:   Days:    Peripheral IV 07/20/19 2139 Right Antecubital   07/20/19 2139    Antecubital   less than 1                Labs (abnormal labs have a star):   Labs Reviewed   COMPREHENSIVE METABOLIC PANEL - Abnormal; Notable for the following components:       Result Value    Glucose 146 (*)     Creatinine 1.35 (*)     eGFR Non  Amer 55 (*)     All other components within normal limits    Narrative:     GFR Normal >60  Chronic Kidney Disease <60  Kidney Failure <15   CBC WITH AUTO DIFFERENTIAL - Abnormal; Notable for the following components:    RDW 12.0 (*)     Lymphocytes, Absolute 3.63 (*)     All other components within normal limits   PROTIME-INR - Normal   LIPASE - Normal   TROPONIN (IN-HOUSE) - Normal    Narrative:     Troponin T Reference Range:  <= 0.03 ng/mL-   Negative for AMI  >0.03 ng/mL-     Abnormal for  myocardial necrosis.  Clinicians would have to utilize clinical acumen, EKG, Troponin and serial changes to determine if it is an Acute Myocardial Infarction or myocardial injury due to an underlying chronic condition.    MAGNESIUM - Normal   URINALYSIS W/ MICROSCOPIC IF INDICATED (NO CULTURE)   CBC AND DIFFERENTIAL    Narrative:     The following orders were created for panel order CBC & Differential.  Procedure                               Abnormality         Status                     ---------                               -----------         ------                     CBC Auto Differential[[id number]]        Abnormal            Final result                 Please view results for these tests on the individual orders.       EKG:   ECG 12 Lead   Preliminary Result   HEART RATE= 75  bpm   RR Interval= 804  ms   HI Interval= 168  ms   P Horizontal Axis= -6  deg   P Front Axis= 62  deg   QRSD Interval= 89  ms   QT Interval= 407  ms   QRS Axis= 61  deg   T Wave Axis= 44  deg   - NORMAL ECG -   Sinus rhythm   Electronically Signed By:    Date and Time of Study: 2019-07-20 22:47:56      ECG 12 Lead   Preliminary Result   HEART RATE= 70  bpm   RR Interval= 856  ms   HI Interval= 160  ms   P Horizontal Axis= -6  deg   P Front Axis= 70  deg   QRSD Interval= 88  ms   QT Interval= 413  ms   QRS Axis= 65  deg   T Wave Axis= 40  deg   - NORMAL ECG -   Sinus rhythm   Electronically Signed By:    Date and Time of Study: 2019-07-20 21:31:33          Meds given in ED:   Medications   sodium chloride 0.9 % flush 10 mL (not administered)   sodium chloride 0.9 % bolus 1,000 mL (0 mL Intravenous Stopped 7/21/19 0031)   famotidine (PEPCID) injection 20 mg (20 mg Intravenous Given 7/20/19 2157)   aluminum-magnesium hydroxide-simethicone (MAALOX MAX) 400-400-40 MG/5ML suspension 15 mL (15 mL Oral Given 7/20/19 2158)   Lidocaine Viscous HCl (XYLOCAINE) 2 % mouth solution 15 mL (15 mL Mouth/Throat Given 7/20/19 2158)   HYDROmorphone  (DILAUDID) injection 0.5 mg (0.5 mg Intravenous Given 7/20/19 2226)   ondansetron (ZOFRAN) injection 4 mg (4 mg Intravenous Given 7/20/19 2226)   iopamidol (ISOVUE-370) 76 % injection 100 mL (95 mL Intravenous Given by Other 7/20/19 2245)   HYDROmorphone (DILAUDID) injection 1 mg (1 mg Intravenous Given 7/20/19 2350)   piperacillin-tazobactam (ZOSYN) 3.375 g in iso-osmotic dextrose 50 ml (premix) (0 g Intravenous Stopped 7/21/19 0031)   ondansetron (ZOFRAN) injection 4 mg (4 mg Intravenous Given 7/21/19 0030)       Imaging results:  Ct Abdomen Pelvis With Contrast    Result Date: 7/20/2019   1. No acute pulmonary thromboembolus seen. 2. Dilatation of the aortic root, measuring 4.4 cm. The remainder of the thoracic aorta measures within normal size limits, and there is no evidence of dissection. 3. The patient is noted to have a probable 9 mm stone located at the junction of the neck of the gallbladder and cystic duct. I think this is resulting in acute obstruction, as there is some mild soft tissue stranding identified within the gallbladder fossa. The gallbladder appears mildly distended. No additional stones are seen within the gallbladder itself. I do not see any definite gallbladder wall thickening, but ultrasound would be more sensitive for evaluation. No common bile duct stones are identified. There is no intra or extrahepatic biliary dilatation. This was discussed with Dr. Ramos prior to this dictation.  Radiation dose reduction techniques were utilized, including automated exposure control and exposure modulation based on body size.  This report was finalized on 7/20/2019 11:09 PM by Dr. Leah Guillen M.D.      Us Gallbladder    Result Date: 7/20/2019  Suspected acute cholecystitis secondary to a hyperechoic structure which is located near the junction of the gallbladder neck and cystic duct. It may reflect a sludge ball, as it is not associated with any posterior shadowing. It does not show any  internal color Doppler flow to suggest that it is a solid mass. There is no intra or extrahepatic biliary dilatation.  This report was finalized on 7/20/2019 11:51 PM by Dr. Leah Guillen M.D.      Xr Chest 1 View    Result Date: 7/20/2019  No acute findings.  This report was finalized on 7/20/2019 10:06 PM by Dr. Leah Guillen M.D.      Ct Angiogram Chest With Contrast    Result Date: 7/20/2019   1. No acute pulmonary thromboembolus seen. 2. Dilatation of the aortic root, measuring 4.4 cm. The remainder of the thoracic aorta measures within normal size limits, and there is no evidence of dissection. 3. The patient is noted to have a probable 9 mm stone located at the junction of the neck of the gallbladder and cystic duct. I think this is resulting in acute obstruction, as there is some mild soft tissue stranding identified within the gallbladder fossa. The gallbladder appears mildly distended. No additional stones are seen within the gallbladder itself. I do not see any definite gallbladder wall thickening, but ultrasound would be more sensitive for evaluation. No common bile duct stones are identified. There is no intra or extrahepatic biliary dilatation. This was discussed with Dr. Ramos prior to this dictation.  Radiation dose reduction techniques were utilized, including automated exposure control and exposure modulation based on body size.  This report was finalized on 7/20/2019 11:09 PM by Dr. Leah Guillen M.D.        Ambulatory status:   Up with assist    Social issues:   Social History     Socioeconomic History   • Marital status:      Spouse name: Not on file   • Number of children: Not on file   • Years of education: Not on file   • Highest education level: Not on file   Tobacco Use   • Smoking status: Never Smoker   • Smokeless tobacco: Never Used   Substance and Sexual Activity   • Alcohol use: Yes     Comment: socially   • Drug use: No        Gillian Gonsales RN  07/21/19  0049

## 2019-07-21 NOTE — OP NOTE
PREOPERATIVE DIAGNOSIS: Acute cholecystitis, cholelithiasis  POSTOPERATIVE DIAGNOSIS: Same   PROCEDURE: Laparoscopic cholecystectomy with Intraoperative cholangiogram  SURGEON/STAFF: ABDELRAHMAN Hernandez    ASST: Lala TRAORE  SPECIMENS: Gallbladder.  INTRAOPERATIVE COMPLICATIONS: None.  ANESTHESIA: General.  BLOOD LOSS:  none  COUNTS: Needle and sponge counts correct.   FINDINGS: Cholelithiasis, acute cholecystitis, questionable CBD defect    INDICATIONS: This is a pleasant patient who presented with evidence of symptomatic cholelithiasis, possible cholecystitis He was seen in the emergency room and admitted overnight for hydration and IV antibiotics.  Risks and benefits of laparoscopic, open, and conservative management of the cholecystitis were discussed at length and in detail with the patient. This included detail drawings of the anatomy and possible postoperative complications including but not limited to bile leak, retained stone, bleeding and common bile duct injury. He agreed and was consented for operative management without duress.     PROCEDURE: The patient was brought to the operating room in stable condition. Perioperative antibiotics were given and sequential compression devices applied. He was then laid supine on the operating room table. General anesthesia was induced by the Anesthesia Service without difficulty.     At this time, the patient's abdomen was accessed at the epigastric area with Optiview technique and insertion of a 5 mm trocar.  Pneumoperitoneum was insufflated.  Upon expiration of the abdominal cavity there was no injury from trocar placement.  Another 5 mm trocar was placed in the supraumbilical area . At this time the patient was placed in steep reverse Trendelenburg and a slightly left-side down position. Two additional 5-mm trocars were placed at the right upper quadrant subcostal area midclavicular and anterior axillary line.  The initial 5 mm epigastric trocar was switched by an 11 mm  trocar.  this was under direct vision.       The peritoneal attachment of the gallbladder at the level of the infundibulum was scored from laterally to medially, terminating at the level of the hepatic edge. The peritoneum was gently stripped down, exposing the underlying cystic artery and cystic duct. This was also done on the lateral side of the gallbladder by reflecting the gallbladder medially. The peritoneal attachment here was again gently dissected inferiorly. After completely exposing the cystic duct as well as cystic artery, a retro-cystic window was created. These 2 structures, the cystic duct and cystic artery, were further delineated. A firm critical view was obtained, visualizing healthy-appearing hepatic tissue behind the 2 structures, with no evidence of looping, bridging or tenting of the common bile duct.     A Hemo-Lock clip was placed distally at the cystic duct and a cystic duct incision with laparoscopic scissors was performed.  Small amount of sludge was milked from the cystic duct.  A 16 Fr sheet was placed through the patient abdominal wall and a Cholangio-catheter was inserted into the patient abdomen. The catheter was secured inside the cystic duct with a metallic clip. Cholangiogram was performed that showed filling of the cystic duct as well as the common bile duct and the right and left hepatic ducts. There’s prompt emptying of the contrast into the duodenum and but at the distal common bile duct there is a small defect that is likely a normal anatomical variation and not a defect.  There was no dilation of the biliary tree. next, the metallic clip was removed and the cystic duct was once again visualized and after confirming that it was indeed the cystic duct, it was clipped twice proximally. It was then transected. Next, the cystic artery was clipped twice proximally and once distally. It was inspected and seen to be in intact. The gallbladder was then carefully dissected off the  hepatic bed using hook electrocautery. It was firmly adherent to the underlying bed, and an effort careful and meticulous hemostasis was undertaken. The gallbladder, after being removed from the hepatic bed, was placed in an EndoCatch bag. It was removed through the epigastric trocar without difficulty.    A final complete survey of the abdominal cavity was undertaken, and there was no evidence of bleeding or intrabdominal injury. The 11 mm trocar was removed and the fascial defect was closed with 0 vicryl and endoclose technique.  At this time all 5-mm trocars in the upper abdomen were then removed under direct vision while desufflating the abdomen. Next, the umbilical trocar was removed. The skin incisions were closed with 4-0 Monocryl and surgical glue. At the end of the case, all needle and sponge counts were correct. I, the attending surgeon, was scrubbed, present and persisted in the entire operation. The patient was extubated and taken to the recovery room in stable condition.    Hollis Hernandez MD

## 2019-07-21 NOTE — ANESTHESIA POSTPROCEDURE EVALUATION
"Patient: Benjamin Gan Dr.    Procedure Summary     Date:  07/21/19 Room / Location:  [facility] OR [address] [place] [address] MAIN OR    Anesthesia Start:  1206 Anesthesia Stop:  1332    Procedure:  CHOLECYSTECTOMY LAPAROSCOPIC INTRAOPERATIVE CHOLANGIOGRAM (N/A Abdomen) Diagnosis:      Surgeon:  Hollis Hernandez MD Provider:  Faisal Goodwin MD    Anesthesia Type:  general ASA Status:  3          Anesthesia Type: general  Last vitals  BP   113/67 (07/21/19 1400)   Temp   36.8 °C (98.2 °F) (07/21/19 1400)   Pulse   68 (07/21/19 1400)   Resp   16 (07/21/19 1400)     SpO2   100 % (07/21/19 1400)     Post Anesthesia Care and Evaluation    Patient location during evaluation: bedside  Patient participation: complete - patient participated  Level of consciousness: awake and alert  Pain management: adequate  Airway patency: patent  Anesthetic complications: No anesthetic complications  PONV Status: none  Cardiovascular status: acceptable  Respiratory status: acceptable  Hydration status: acceptable    Comments: /67   Pulse 68   Temp 36.8 °C (98.2 °F) (Oral)   Resp 16   Ht 190.5 cm (75\")   Wt 96.3 kg (212 lb 4.8 oz)   SpO2 100%   BMI 26.54 kg/m²         "

## 2019-07-21 NOTE — H&P
"Admit H&P    Admiting Physician: Hollis Hernandez MD    Reason for Admission: Symptomatic cholelithiasis, possible acute cholecystitis    Chief Complaint   Patient presents with   • Abdominal Pain       HPI:   The patient is a very pleasant 54 y.o. years old male that presented to the hospital with abdominal pain. complaining of sharp, stabbing, epigastric abdominal pain that began suddenly at 1930 today and radiates into his back. Pt is also complaining of nausea. Pt denies vomiting, chest pain, fever, cough, and cold. Pt states he ate homemade salsa two hours prior to pain starting and thought it might be GERD (pt reports a hx of GERD) but states episode did not feel like previous episodes. Pt reports he drank sprite after the pain began and belched multiple times with no improvement in the pain.  Denies similar episodes of pain like this in the past      Past Medical History:   Diagnosis Date   • Colon polyp    • Flea bite    • Knee pain    • Malignant melanoma (CMS/HCC)    • Migraine     Only developed unilateral throbbing H/A after withdrawl from caffeine but denies spontaneous migraines   • Numbness    • Palpitations    • PFO (patent foramen ovale)    • Scotoma     \"classic\" fortification spectra, lasting 15min without H/A   • Screening PSA (prostate specific antigen)    • Stroke (CMS/HCC)    • TIA (transient ischemic attack)    • Urinary frequency        Past Surgical History:   Procedure Laterality Date   • COLONOSCOPY     • HERNIA REPAIR     • PATENT FORAMEN OVALE CLOSURE           Current Facility-Administered Medications:   •  ondansetron (ZOFRAN) 4 MG/2ML injection  - ADS Override Pull, , , ,   •  ondansetron (ZOFRAN) injection 4 mg, 4 mg, Intravenous, Once, Chidi Heath MD  •  [COMPLETED] Insert peripheral IV, , , Once **AND** sodium chloride 0.9 % flush 10 mL, 10 mL, Intravenous, PRN, Álvaro Ramos MD    Current Outpatient Medications:   •  aspirin 325 MG tablet, Take 325 mg by mouth daily., " Disp: , Rfl:   •  hepatitis A (HAVRIX) 1440 EL U/ML vaccine, Inject  into the shoulder, thigh, or buttocks., Disp: 1 mL, Rfl: 0  •  hepatitis A (HAVRIX) 1440 EL U/ML vaccine, Inject  into the appropriate muscle as directed by prescriber., Disp: 1 mL, Rfl: 0  •  sodium phosphates (OSMOPREP) 1.102-0.398 g tablet tablet, Starting at 3:00pm -4 tabs every 15 min for 5 doses (20 tabs), second dosing starting at 11:00pm 4 tabs every 15 min. For 2 doses (8 tabs), Disp: 28 tablet, Rfl: 0    Allergies   Allergen Reactions   • Plavix [Clopidogrel Bisulfate]        Social History     Socioeconomic History   • Marital status:      Spouse name: Not on file   • Number of children: Not on file   • Years of education: Not on file   • Highest education level: Not on file   Tobacco Use   • Smoking status: Never Smoker   • Smokeless tobacco: Never Used   Substance and Sexual Activity   • Alcohol use: Yes     Comment: socially   • Drug use: No       Family History   Problem Relation Age of Onset   • Hypertension Mother    • Hypertension Father    • Hypertension Sister    • Hypertension Brother    • Hyperlipidemia Other    • Neuropathy Other        ROS:   Constitutional: denies any weight changes, fatigue or weakness.  Eyes: : denies blurred or double vision  Cardiovascular: denies chest pain, palpitations, edemas.  Respiratory: denies cough, sputum, SOB.  Gastrointestinal: denies diarrhea, constipation.  Genitourinary: denies dysuria, frequency.  Endocrine: denies cold intolerance, lethargy and flushing.  Hem: denies excessive bruising and postop bleeding.  Musculoskeletal: denies weakness, joint swelling, pain or stiffness.  Neuro: denies seizures, CVA, paresthesia, or peripheral neuropathy.   Skin: denies change in nevi, rashes, masses.    Physical Exam:   Vitals:    07/20/19 2343   BP: 155/65   Pulse:    Resp:    Temp:    SpO2: 96%     GENERAL:oriented to person, place, and time and ill-appearing  HEENT: normochephalic,  atraumatic, no scleral icterus moist mucous membranes.  NECK: Supple there is no thyromegaly or lymphadenopathy  CHEST: clear to auscultation, no wheezes, rales or rhonchi, symmetric air entry  CARDIAC: regular rate and rhythm    ABDOMEN: soft, nontender, nondistended, no masses or organomegaly  EXTREMITIES: no cyanosis, clubbing or edema    NEURO: alert and oriented, normal speech, cranial nerves 2-12 grossly intact, no focal deficits   SKIN: Moist, warm, no rashes.    Diagnostic workup:   Gallbladder ultrasound (7/20/2019): Mild distention of the gallbladder, no stones identified within the lumen of the gallbladder, echogenic structure near the junction of the neck of the gallbladder and the cystic duct.  There is no gallbladder edema or pericholecystic fluid and no biliary tree dilation    CT scan of the chest and abdomen: 9 mm stone located at the neck of the gallbladder, gallbladder distention and mild soft tissue stranding, no gallbladder wall thickening    Creatinine 1.3, CMP otherwise within normal limits  CBC within normal limits    Assessment and plan:   The patient is a very pleasant 54 y.o. years old male with pain, nausea diaphoresis, cholelithiasis and possible acute cholecystitis.  Also food poisoning is a concern since he had homemade salsa that he had 2 hours before the symptoms started.  Patient does not have the much pain right now and is feeling a little bit better.  Discussed with him about further step.  I think he should be admitted for hydration and IV antibiotics.  He should undergo laparoscopic cholecystectomy with intraoperative cholangiogram tomorrow morning.  Risk and benefits of the procedure including bleeding, infection, biliary tree injuries were discussed in detail with the patient that verbalized understanding and agreed with the plan    -Admit to hospital  -IV antibiotics  -N.p.o.  -Laparoscopic cholecystectomy with intraoperative cholangiogram    Case was discussed with  patient.    Risk and benefits of the current recommended treatment were explained to the patient that had time to ask questions that where answered, verbalized understanding and agreed with the plan.     Hollis Hernandez MD  General, Minimally Invasive and Endoscopic Surgery  [facility]    [address], [address]  [place], [place], [address]  P: [phone]  F: [phone]

## 2019-07-21 NOTE — ED PROVIDER NOTES
EMERGENCY DEPARTMENT ENCOUNTER    CHIEF COMPLAINT  Chief Complaint: Abdominal pain   History given by: Patient   History limited by: Nothing   Room Number: 13/13  PMD: Smooth Birch MD      HPI:  Pt is a 54 y.o. male who presents complaining of sharp, stabbing, epigastric abdominal pain that began suddenly at 1930 today and radiates into his back. Pt is also complaining of nausea. Pt denies vomiting, chest pain, fever, cough, and cold. Pt states he ate homemade salsa two hours prior to pain starting and thought it might be GERD (pt reports a hx of GERD) but states episode did not feel like previous episodes. Pt reports he drank sprite after the pain began and belched multiple times with no improvement in the pain. Pt denies any modifying factors. Pt denies hx of heart disease or blood clots. Pt reports hx of a CVA (for which he takes ASA daily) and a hernia repair 15 years ago.        Duration:  Since 1930  Onset: Sudden  Timing: Constant   Location: Mid-epigastric region   Radiation: To back   Quality: Sharp, stabbing  Intensity/Severity: Moderate  Progression: Unchanged  Associated Symptoms: Nausea  Aggravating Factors: None  Alleviating Factors: None  Previous Episodes: None  Treatment before arrival: None    PAST MEDICAL HISTORY  Active Ambulatory Problems     Diagnosis Date Noted   • Flea bite 02/26/2016   • Knee pain 02/26/2016   • Numbness 02/26/2016   • Palpitations 02/26/2016   • Patent foramen ovale 02/26/2016   • Cerebrovascular accident (CMS/HCC) 02/26/2016   • Temporary cerebral vascular dysfunction 02/26/2016   • Increased frequency of urination 02/26/2016     Resolved Ambulatory Problems     Diagnosis Date Noted   • No Resolved Ambulatory Problems     Past Medical History:   Diagnosis Date   • Colon polyp    • Flea bite    • Knee pain    • Malignant melanoma (CMS/HCC)    • Migraine    • Numbness    • Palpitations    • PFO (patent foramen ovale)    • Scotoma    • Screening PSA (prostate specific  antigen)    • Stroke (CMS/HCC)    • TIA (transient ischemic attack)    • Urinary frequency        PAST SURGICAL HISTORY  Past Surgical History:   Procedure Laterality Date   • COLONOSCOPY     • HERNIA REPAIR     • PATENT FORAMEN OVALE CLOSURE         FAMILY HISTORY  Family History   Problem Relation Age of Onset   • Hypertension Mother    • Hypertension Father    • Hypertension Sister    • Hypertension Brother    • Hyperlipidemia Other    • Neuropathy Other        SOCIAL HISTORY  Social History     Socioeconomic History   • Marital status:      Spouse name: Not on file   • Number of children: Not on file   • Years of education: Not on file   • Highest education level: Not on file   Tobacco Use   • Smoking status: Never Smoker   • Smokeless tobacco: Never Used   Substance and Sexual Activity   • Alcohol use: Yes     Comment: socially   • Drug use: No       ALLERGIES  Plavix [clopidogrel bisulfate]    REVIEW OF SYSTEMS  Review of Systems   Constitutional: Negative for activity change, appetite change and fever.   HENT: Negative for congestion and sore throat.         Denies cold   Eyes: Negative.    Respiratory: Negative for cough and shortness of breath.    Cardiovascular: Negative for chest pain and leg swelling.   Gastrointestinal: Positive for abdominal pain and nausea. Negative for diarrhea and vomiting.   Endocrine: Negative.    Genitourinary: Negative for decreased urine volume and dysuria.   Musculoskeletal: Negative for neck pain.   Skin: Negative for rash and wound.   Allergic/Immunologic: Negative.    Neurological: Negative for weakness, numbness and headaches.   Hematological: Negative.    Psychiatric/Behavioral: Negative.    All other systems reviewed and are negative.      PHYSICAL EXAM  ED Triage Vitals [07/20/19 2126]   Temp Heart Rate Resp BP SpO2   97.5 °F (36.4 °C) 81 16 -- 99 %      Temp src Heart Rate Source Patient Position BP Location FiO2 (%)   Tympanic Monitor -- -- --       Physical  Exam   Constitutional: He is oriented to person, place, and time. No distress.   Appears uncomfortable.   HENT:   Head: Normocephalic and atraumatic.   Eyes: Conjunctivae and EOM are normal. Pupils are equal, round, and reactive to light.   Neck: Normal range of motion. Neck supple. No tracheal deviation present.   Cardiovascular: Normal rate, regular rhythm, normal heart sounds and intact distal pulses.   No murmur heard.  Pulmonary/Chest: Effort normal and breath sounds normal. No stridor. No respiratory distress. He has no decreased breath sounds. He has no wheezes. He has no rhonchi. He has no rales. He exhibits no tenderness.   Abdominal: Soft. Bowel sounds are normal. There is tenderness. There is no rebound and no guarding.   No RUQ pain on exam.  Aparicio sign.  When asked to point to location of pain, pt points out mid-epigastric region has some tenderness on palpation.  No guarding or rebound.   Musculoskeletal: Normal range of motion. He exhibits no edema.   No BLE edema, good distal pulses. Normal back inspection.    Neurological: He is alert and oriented to person, place, and time. He has normal sensation and normal strength. No cranial nerve deficit. GCS score is 15.   Skin: Skin is warm and dry. No rash noted. No erythema.   Psychiatric: Mood and affect normal.   Nursing note and vitals reviewed.      LAB RESULTS  Lab Results (last 24 hours)     Procedure Component Value Units Date/Time    CBC & Differential [[id number]] Collected:  07/20/19 2141    Specimen:  Blood Updated:  07/20/19 2153    Narrative:       The following orders were created for panel order CBC & Differential.  Procedure                               Abnormality         Status                     ---------                               -----------         ------                     CBC Auto Differential[[id number]]        Abnormal            Final result                 Please view results for these tests on the individual orders.     Comprehensive Metabolic Panel [[id number]]  (Abnormal) Collected:  07/20/19 2141    Specimen:  Blood Updated:  07/20/19 2213     Glucose 146 mg/dL      BUN 12 mg/dL      Creatinine 1.35 mg/dL      Sodium 138 mmol/L      Potassium 3.5 mmol/L      Chloride 100 mmol/L      CO2 26.7 mmol/L      Calcium 9.1 mg/dL      Total Protein 6.8 g/dL      Albumin 4.50 g/dL      ALT (SGPT) 16 U/L      AST (SGOT) 18 U/L      Alkaline Phosphatase 66 U/L      Total Bilirubin 0.6 mg/dL      eGFR Non African Amer 55 mL/min/1.73      Globulin 2.3 gm/dL      A/G Ratio 2.0 g/dL      BUN/Creatinine Ratio 8.9     Anion Gap 11.3 mmol/L     Narrative:       GFR Normal >60  Chronic Kidney Disease <60  Kidney Failure <15    Protime-INR [[id number]]  (Normal) Collected:  07/20/19 2141    Specimen:  Blood Updated:  07/20/19 2206     Protime 13.4 Seconds      INR 1.05    Lipase [[id number]]  (Normal) Collected:  07/20/19 2141    Specimen:  Blood Updated:  07/20/19 2213     Lipase 30 U/L     Troponin [[id number]]  (Normal) Collected:  07/20/19 2141    Specimen:  Blood Updated:  07/20/19 2213     Troponin T <0.010 ng/mL     Narrative:       Troponin T Reference Range:  <= 0.03 ng/mL-   Negative for AMI  >0.03 ng/mL-     Abnormal for myocardial necrosis.  Clinicians would have to utilize clinical acumen, EKG, Troponin and serial changes to determine if it is an Acute Myocardial Infarction or myocardial injury due to an underlying chronic condition.     Magnesium [[id number]]  (Normal) Collected:  07/20/19 2141    Specimen:  Blood Updated:  07/20/19 2213     Magnesium 2.3 mg/dL     CBC Auto Differential [[id number]]  (Abnormal) Collected:  07/20/19 2141    Specimen:  Blood Updated:  07/20/19 2153     WBC 9.48 10*3/mm3      RBC 5.18 10*6/mm3      Hemoglobin 15.4 g/dL      Hematocrit 44.3 %      MCV 85.5 fL      MCH 29.7 pg      MCHC 34.8 g/dL      RDW 12.0 %      RDW-SD 37.6 fl      MPV 9.3 fL      Platelets 206 10*3/mm3      Neutrophil % 52.0 %       Lymphocyte % 38.3 %      Monocyte % 7.4 %      Eosinophil % 1.7 %      Basophil % 0.4 %      Immature Grans % 0.2 %      Neutrophils, Absolute 4.93 10*3/mm3      Lymphocytes, Absolute 3.63 10*3/mm3      Monocytes, Absolute 0.70 10*3/mm3      Eosinophils, Absolute 0.16 10*3/mm3      Basophils, Absolute 0.04 10*3/mm3      Immature Grans, Absolute 0.02 10*3/mm3      nRBC 0.0 /100 WBC           I ordered the above labs and reviewed the results    RADIOLOGY  US Gallbladder   Final Result   Suspected acute cholecystitis secondary to a hyperechoic structure which   is located near the junction of the gallbladder neck and cystic duct. It   may reflect a sludge ball, as it is not associated with any posterior   shadowing. It does not show any internal color Doppler flow to suggest   that it is a solid mass. There is no intra or extrahepatic biliary   dilatation.       This report was finalized on 7/20/2019 11:51 PM by Dr. Leah Guillen M.D.          CT Abdomen Pelvis With Contrast   Final Result       1. No acute pulmonary thromboembolus seen.   2. Dilatation of the aortic root, measuring 4.4 cm. The remainder of the   thoracic aorta measures within normal size limits, and there is no   evidence of dissection.   3. The patient is noted to have a probable 9 mm stone located at the   junction of the neck of the gallbladder and cystic duct. I think this is   resulting in acute obstruction, as there is some mild soft tissue   stranding identified within the gallbladder fossa. The gallbladder   appears mildly distended. No additional stones are seen within the   gallbladder itself. I do not see any definite gallbladder wall   thickening, but ultrasound would be more sensitive for evaluation. No   common bile duct stones are identified. There is no intra or   extrahepatic biliary dilatation. This was discussed with Dr. Ramos   prior to this dictation.       Radiation dose reduction techniques were utilized, including  automated   exposure control and exposure modulation based on body size.       This report was finalized on 7/20/2019 11:09 PM by Dr. Leah Guillen M.D.          CT Angiogram Chest With Contrast   Final Result       1. No acute pulmonary thromboembolus seen.   2. Dilatation of the aortic root, measuring 4.4 cm. The remainder of the   thoracic aorta measures within normal size limits, and there is no   evidence of dissection.   3. The patient is noted to have a probable 9 mm stone located at the   junction of the neck of the gallbladder and cystic duct. I think this is   resulting in acute obstruction, as there is some mild soft tissue   stranding identified within the gallbladder fossa. The gallbladder   appears mildly distended. No additional stones are seen within the   gallbladder itself. I do not see any definite gallbladder wall   thickening, but ultrasound would be more sensitive for evaluation. No   common bile duct stones are identified. There is no intra or   extrahepatic biliary dilatation. This was discussed with Dr. Ramos   prior to this dictation.       Radiation dose reduction techniques were utilized, including automated   exposure control and exposure modulation based on body size.       This report was finalized on 7/20/2019 11:09 PM by Dr. Leah Guillen M.D.          XR Chest 1 View   Final Result   No acute findings.       This report was finalized on 7/20/2019 10:06 PM by Dr. Leah Guillen M.D.               I ordered the above noted radiological studies. Interpreted by radiologist. Discussed with radiologist (). Reviewed by me in PACS.       PROCEDURES  Procedures     EKG          EKG time: 2131  Rhythm/Rate: SR 70  P waves and RI: Narrow complex  QRS, axis: Normal axis   ST and T waves: No acute process, normal QT    Interpreted Contemporaneously by me, independently viewed  Unchanged compared to prior 8/29/14    EKG          EKG time: 2247  Rhythm/Rate: SR 75  P  waves and WI: Normal complex  QRS, axis: Normal axis   ST and T waves: No acute process     Interpreted Contemporaneously by me, independently viewed  Unchanged compared to prior EKG (see above)          PROGRESS AND CONSULTS  ED Course as of Jul 21 0015   Sun Jul 21, 2019   0011 12:11 AM  I spoke with the patient and children which are at bedside.  Informed the results of the tests and treatment plan.  Pain is starting to improve and does not need any more pain medicine at this time.    I spoke with Dr. Hernandez and he is reviewed the images.  He is can come down the emergency department see the patient and he will admit the patient to the hospital.  [MM]      ED Course User Index  [MM] Álvaro Ramos MD   2223 Rechecked pt who states he is in increased pain.  Appears more uncomfortable and diaphoretic.  Discussed ordering pain medication. Pt denies numbness and tingling in extremities. Discussed plan to order CT for further evaluation.     2256 Spoke with  (Radiology) who states CT chest and A/P showed cholecystis     2300 Rechecked pt who states he is still in pain. Informed pt of CT results which showed stone in cystic duct. Pt requested additional pain medication. Discussed plan to consult surgery and plan to obtain ultrasound.     2307 Spoke with 's nurse (Surgery) because  is in surgery who agreed to admit     MEDICAL DECISION MAKING  Results were reviewed/discussed with the patient and they were also made aware of online access. Pt also made aware that some labs, such as cultures, will not be resulted during ER visit and follow up with PMD is necessary.     MDM  Number of Diagnoses or Management Options  Cholecystitis:      Amount and/or Complexity of Data Reviewed  Clinical lab tests: ordered and reviewed (Unremarkable )  Tests in the radiology section of CPT®: ordered and reviewed (CT chest no acute findings  CT A/P showed cholecystitis with gall bladder wall thickening    XR chest  no acute findings)  Tests in the medicine section of CPT®: ordered and reviewed (See procedure notes )  Discussion of test results with the performing providers: yes  Discuss the patient with other providers: yes ( nurse (Surgery))  Independent visualization of images, tracings, or specimens: yes    Patient Progress  Patient progress: stable         DIAGNOSIS  Final diagnoses:   Cholecystitis       DISPOSITION  ADMISSION TO MED/SURG    Discussed treatment plan and reason for admission with pt/family and admitting physician.  Pt/family voiced understanding of the plan for admission for further testing/treatment as needed.        Latest Documented Vital Signs:  As of 12:15 AM  BP- 155/65 HR- 72 Temp- 97.5 °F (36.4 °C) (Tympanic) O2 sat- 96%    --  Documentation assistance provided by reshma Perez for .  Information recorded by the scribe was done at my direction and has been verified and validated by me.          Brittani Perez  07/20/19 2341       Álvaro Ramos MD  07/21/19 0015

## 2019-07-22 VITALS
DIASTOLIC BLOOD PRESSURE: 62 MMHG | WEIGHT: 212.3 LBS | TEMPERATURE: 99.6 F | OXYGEN SATURATION: 99 % | BODY MASS INDEX: 26.4 KG/M2 | SYSTOLIC BLOOD PRESSURE: 110 MMHG | HEART RATE: 74 BPM | RESPIRATION RATE: 16 BRPM | HEIGHT: 75 IN

## 2019-07-22 LAB
ALBUMIN SERPL-MCNC: 3.7 G/DL (ref 3.5–5.2)
ALBUMIN/GLOB SERPL: 1.7 G/DL
ALP SERPL-CCNC: 58 U/L (ref 39–117)
ALT SERPL W P-5'-P-CCNC: 23 U/L (ref 1–41)
ANION GAP SERPL CALCULATED.3IONS-SCNC: 9.5 MMOL/L (ref 5–15)
AST SERPL-CCNC: 22 U/L (ref 1–40)
BILIRUB SERPL-MCNC: 1.1 MG/DL (ref 0.2–1.2)
BUN BLD-MCNC: 9 MG/DL (ref 6–20)
BUN/CREAT SERPL: 8 (ref 7–25)
CALCIUM SPEC-SCNC: 8.9 MG/DL (ref 8.6–10.5)
CHLORIDE SERPL-SCNC: 105 MMOL/L (ref 98–107)
CO2 SERPL-SCNC: 27.5 MMOL/L (ref 22–29)
CREAT BLD-MCNC: 1.12 MG/DL (ref 0.76–1.27)
GFR SERPL CREATININE-BSD FRML MDRD: 68 ML/MIN/1.73
GLOBULIN UR ELPH-MCNC: 2.2 GM/DL
GLUCOSE BLD-MCNC: 98 MG/DL (ref 65–99)
POTASSIUM BLD-SCNC: 4 MMOL/L (ref 3.5–5.2)
PROT SERPL-MCNC: 5.9 G/DL (ref 6–8.5)
SODIUM BLD-SCNC: 142 MMOL/L (ref 136–145)

## 2019-07-22 PROCEDURE — 25010000002 PIPERACILLIN SOD-TAZOBACTAM PER 1 G: Performed by: SURGERY

## 2019-07-22 PROCEDURE — 80053 COMPREHEN METABOLIC PANEL: CPT | Performed by: SURGERY

## 2019-07-22 RX ORDER — PROMETHAZINE HYDROCHLORIDE 12.5 MG/1
12.5 TABLET ORAL EVERY 6 HOURS PRN
Qty: 10 TABLET | Refills: 0 | Status: SHIPPED | OUTPATIENT
Start: 2019-07-22 | End: 2019-08-06

## 2019-07-22 RX ORDER — AMOXICILLIN 250 MG
2 CAPSULE ORAL DAILY PRN
Qty: 30 TABLET | Refills: 1 | Status: SHIPPED | OUTPATIENT
Start: 2019-07-22 | End: 2019-08-06

## 2019-07-22 RX ADMIN — TAZOBACTAM SODIUM AND PIPERACILLIN SODIUM 3.38 G: 375; 3 INJECTION, SOLUTION INTRAVENOUS at 04:55

## 2019-07-22 RX ADMIN — ACETAMINOPHEN 650 MG: 325 TABLET, FILM COATED ORAL at 07:58

## 2019-07-22 RX ADMIN — SODIUM CHLORIDE, PRESERVATIVE FREE 3 ML: 5 INJECTION INTRAVENOUS at 07:54

## 2019-07-22 NOTE — DISCHARGE SUMMARY
Admission date: 7/20/2019   Discharge date: 7/22/19.     Admission diagnosis: Cholecystitis [K81.9]     Discharge diagnosis: Same     Procedure: Laparoscopic cholecystectomy with intraoperative cholangiogram    Hospital course: The patient was admitted to the hospital with abdominal pain.  He was found to have acute cholecystitis.  He was taken to the operating room on hospital day 2.  His postoperative course was uneventful.  He was discharged home on postoperative day 1 in stable condition.      Meds:      Your medication list      START taking these medications      Instructions Last Dose Given Next Dose Due   promethazine 12.5 MG tablet  Commonly known as:  PHENERGAN      Take 1 tablet by mouth Every 6 (Six) Hours As Needed for Nausea or Vomiting.       senna-docusate 8.6-50 MG per tablet  Commonly known as:  PERICOLACE      Take 2 tablets by mouth Daily As Needed for Constipation.          CONTINUE taking these medications      Instructions Last Dose Given Next Dose Due   aspirin 325 MG tablet      Take 81 mg by mouth Daily.       HAVRIX 1440 EL U/ML vaccine  Generic drug:  hepatitis A      Inject  into the shoulder, thigh, or buttocks.       HAVRIX 1440 EL U/ML vaccine  Generic drug:  hepatitis A      Inject  into the appropriate muscle as directed by prescriber.       sodium phosphates 1.102-0.398 g tablet tablet  Commonly known as:  OSMOPREP      Starting at 3:00pm -4 tabs every 15 min for 5 doses (20 tabs), second dosing starting at 11:00pm 4 tabs every 15 min. For 2 doses (8 tabs)             Where to Get Your Medications      These medications were sent to [address] AT [address] [facility] [phone] [facility] [phone] [place] [address]    Phone:  [phone]   · promethazine 12.5 MG tablet  · senna-docusate 8.6-50 MG per tablet         Instructions:    - No heavy lifting of more than 15 lb for 6 weeks. Can  start doing aerobic type exercise in 4 weeks.   - No driving while taking pain medications  - Take medications as prescribed.   - Can shower, no bath tub    Fu in my office in 2 weeks, call for appointment     Hollis Hernandez MD  General, Minimally Invasive and Endoscopic Surgery  [facility]

## 2019-07-22 NOTE — DISCHARGE INSTRUCTIONS
POST OP RECOMMENDATIONS  Dr. Hollis Hernandez  [phone]  Discharge Gall Bladder Surgery    1. Go home, rest and take it easy today; however, you should get up and move about several times today to reduce the risk of developing a blood clot in your legs.   2. You may experience some dizziness or memory loss from the anesthesia. This may last for the next 24 hours. Someone should plan on staying with you for the first 24 hours for your safety.   3. Do not make any important legal decisions or sign any legal papers for the next 24 hours.   4. Eat and drink lightly today. Start off with liquids, jello, soup, crackers or other bland foods at first. You may advance your diet tomorrow as tolerated as long as you do not experience any nausea or vomiting.   5. You may remove your outer dressings in 3 days. The white tapes called steri-strips should stay in place. They will fall off on their own in 1-2 weeks. Do not worry if they come off sooner.   6. You may notice some bleeding/drainage on your outer dressings. A little bloody drainage is normal. If the bleeding/drainage is such that the bandage cannot absorb it, remove the dressing, apply clean gauze and apply firm pressure for a full 15 minutes. If the bleeding continues, please call me.   7. You may shower tomorrow. No tub baths until your incisions are completely healed.   8. You have received a prescription for a narcotic pain medicine, as you will have some pain following surgery.  You will not be totally pain free, but your pain medicine should make the pain tolerable. Please take your pain medicine as prescribed and always take your pills with food to prevent nausea. If you are having severe pain that cannot be controlled by the pain medicine, please contact me.   9. You have also received a prescription for an anti-nausea medicine. Please take this as prescribed for any nausea or vomiting. Nausea could be a result of the anesthesia or a result of the narcotic pain  medicine. If you experience severe nausea and vomiting that cannot be controlled by the nausea medicine, please call me.   10. It is not unusual to experience pain/discomfort in your shoulders or your ribs after surgery. It is from the gas used during the laparoscopic procedure and usually lasts 1-3 days. The prescription pain medicine is used to treat the surgical pain and does not typically alleviate this “gassy” pain.   11. No driving for 24 hours and for as long as you are taking your prescription pain medicine.   12. You will need to call the office at [phone] to schedule a follow-up appointment in 6-10 days.   13. Remember to contact me for any of the following:   • Fever > 101 degrees  • Severe pain that cannot be controlled by taking your pain pills  • Severe nausea or vomiting that cannot be controlled by taking your nausea pills  • Significant bleeding of your incisions  • Drainage that has a bad smell or is yellow or green in appearance  • Any other questions or concerns

## 2019-07-22 NOTE — PLAN OF CARE
Problem: Patient Care Overview  Goal: Plan of Care Review  Outcome: Ongoing (interventions implemented as appropriate)   07/22/19 6478   Plan of Care Review   Progress improving   OTHER   Outcome Summary client POD 0 of lap. laura. VSS, pain well controlled nonpharmacologically, denies any PONV. up ad saray to BR and with amb. incisions to abd, c,d,i. plans to d/c home 7/22. discussed s/s of stroke r/t hx stroke and TIA.    Coping/Psychosocial   Plan of Care Reviewed With patient     Goal: Individualization and Mutuality  Outcome: Ongoing (interventions implemented as appropriate)    Goal: Discharge Needs Assessment  Outcome: Ongoing (interventions implemented as appropriate)      Problem: Nausea/Vomiting (Adult)  Goal: Identify Related Risk Factors and Signs and Symptoms  Outcome: Outcome(s) achieved Date Met: 07/22/19    Goal: Symptom Relief  Outcome: Ongoing (interventions implemented as appropriate)    Goal: Adequate Hydration  Outcome: Ongoing (interventions implemented as appropriate)      Problem: Pain, Acute (Adult)  Goal: Identify Related Risk Factors and Signs and Symptoms  Outcome: Outcome(s) achieved Date Met: 07/22/19    Goal: Acceptable Pain Control/Comfort Level  Outcome: Ongoing (interventions implemented as appropriate)      Problem: Cholecystectomy (Adult)  Goal: Signs and Symptoms of Listed Potential Problems Will be Absent, Minimized or Managed (Cholecystectomy)  Outcome: Ongoing (interventions implemented as appropriate)    Goal: Anesthesia/Sedation Recovery  Outcome: Ongoing (interventions implemented as appropriate)

## 2019-07-24 LAB
CYTO UR: NORMAL
LAB AP CASE REPORT: NORMAL
PATH REPORT.FINAL DX SPEC: NORMAL
PATH REPORT.GROSS SPEC: NORMAL

## 2019-08-06 ENCOUNTER — OFFICE VISIT (OUTPATIENT)
Dept: SURGERY | Facility: CLINIC | Age: 54
End: 2019-08-06

## 2019-08-06 DIAGNOSIS — Z09 POSTOP CHECK: Primary | ICD-10-CM

## 2019-08-06 PROCEDURE — 99024 POSTOP FOLLOW-UP VISIT: CPT | Performed by: SURGERY

## 2019-08-06 NOTE — PROGRESS NOTES
CC: Postop check    S: This is a 54 y.o. male who presents for a post-operative visit after undergoing a Laparoscopic Cholecystectomy with IOC on 7/21/2019.     Patient reports he is doing well. Eating well without any significant nausea. Having good bowel function. No problems with constipation or diarrhea. No urinary complaints. Denies fever. Ambulating well and slowly returning to normal activities.    Pathology report:    Final Diagnosis   1.  Gallbladder, Cholecystectomy:                A.  Acute and chronic cholecystitis with cholelithiasis and focal early cholesterolosis.      O:   soft, nontender, nondistended, no masses or organomegaly  Incisions are healing well without any erythema or signs of infection. No signs of hernia.     Assessment and plan:     The patient is a very pleasant 54 y.o. male s/p laparoscopic cholecystectomy with intraoperative cholangiogram.  Patient is doing fine and does not have any specific complaints other than mild incisional pain.     I advised the patient to continue to refrain from doing any heavy lifting of more than 15 pounds for a total of 6 weeks.  Patient may start doing an aerobic type exercise in 4 weeks after surgery.    Patient was given time to ask questions and all of them were answered appropriately.  The patient verbalized understanding and agreed with the plan.    he will follow-up at our office on a prn basis unless there are any problems.    Hollis Hernandez MD, FACS  General, Minimally Invasive and Endoscopic Surgery  [facility]    [address] [place] [address], [address]  [place], [place], [address]  P: [phone]  F: [phone]

## 2020-12-01 ENCOUNTER — OFFICE VISIT (OUTPATIENT)
Dept: ORTHOPEDIC SURGERY | Facility: CLINIC | Age: 55
End: 2020-12-01

## 2020-12-01 VITALS — TEMPERATURE: 97.3 F | HEIGHT: 75 IN | WEIGHT: 212 LBS | BODY MASS INDEX: 26.36 KG/M2

## 2020-12-01 DIAGNOSIS — R52 PAIN: Primary | ICD-10-CM

## 2020-12-01 DIAGNOSIS — M54.50 LUMBAR BACK PAIN: ICD-10-CM

## 2020-12-01 PROCEDURE — 99203 OFFICE O/P NEW LOW 30 MIN: CPT | Performed by: ORTHOPAEDIC SURGERY

## 2020-12-01 PROCEDURE — 72100 X-RAY EXAM L-S SPINE 2/3 VWS: CPT | Performed by: ORTHOPAEDIC SURGERY

## 2020-12-01 RX ORDER — DIPHENOXYLATE HYDROCHLORIDE AND ATROPINE SULFATE 2.5; .025 MG/1; MG/1
TABLET ORAL DAILY
Status: ON HOLD | COMMUNITY
End: 2022-02-01

## 2020-12-01 NOTE — PROGRESS NOTES
New patient or new problem visit    Chief Complaint   Patient presents with   • Lumbar Spine - Initial Evaluation, Pain       HPI: He complains of longstanding history of very mild low back pain which she has remedied for years by doing a series of home stretching exercises.  Since Memorial Day of this year, about 6 months ago he has had substantial increase in the back pain.  Sitting is particularly bothersome but he can play disc Frisbee and walk for exercise without much pain there is no radiation numbness tingling or weakness.  The pain got better after a week but has returned at intervals and sometimes radiates to the testicles most recently to the left but previously on the right.  He is tried stretching which is not helping as much now, Advil, Tylenol and he does continue to do his walking and hiking.    PFSH: See chart- reviewed.  He has a history of excision of melanoma from the scalp    Review of Systems   Musculoskeletal: Positive for arthralgias, back pain, neck pain and neck stiffness.       PE: Constitutional: Vital signs above-noted.  Awake, alert and oriented    Psychiatric: Affect and insight do not appear grossly disturbed.    Pulmonary: Breathing is unlabored, color is good.    Skin: Warm, dry and normal turgor    Eyesight and hearing appear adequate for examination purposes      Musculoskeletal:  There is no tenderness to percussion and palpation of the spine. Motion appears undisturbed.  Posture is unremarkable to coronal and sagittal inspection.    The skin about the area is intact.  There is no palpable or visible deformity.  There is no local spasm.       Neurologic:   Reflexes are 2+ and symmetrical in the patellae and absent in the Achilles.   Motor function is undisturbed in quadriceps, EHL, and gastrocnemius   sensation appears symmetrically intact to light touch.  In the bilateral lower extremities there is no evidence of atrophy.   Clonus is absent..  Gait appears undisturbed.  SLR test  negative figure 4 test is negative.      MEDICAL DECISION MAKING    XRAY: Plain film x-rays of the lumbar spine show transitional lumbosacral segment perhaps a bit of degenerative change in the sacroiliac joint on the left, slight reduced disc space height in the last formed and mobile level above the transition which will call L5-S1.  Excellent lordosis slight scoliosis pretty good looking x-ray overall.  No comparison views are available    Other: n/a    Impression: Probable lumbar disc degeneration.  He is done basic conservative care and is been at this for 6 months    Plan: With his history of melanoma we will go ahead and get a contrast-enhanced MRI scan for further evaluation.  If it looks okay then continue stretching exercises cardiovascular activity and reassurance for now.

## 2020-12-15 DIAGNOSIS — M54.50 LUMBAR BACK PAIN: Primary | ICD-10-CM

## 2021-01-05 ENCOUNTER — IMMUNIZATION (OUTPATIENT)
Dept: VACCINE CLINIC | Facility: HOSPITAL | Age: 56
End: 2021-01-05

## 2021-01-05 PROCEDURE — 91300 HC SARSCOV02 VAC 30MCG/0.3ML IM: CPT | Performed by: INTERNAL MEDICINE

## 2021-01-05 PROCEDURE — 0001A: CPT | Performed by: INTERNAL MEDICINE

## 2021-01-10 ENCOUNTER — HOSPITAL ENCOUNTER (OUTPATIENT)
Dept: MRI IMAGING | Facility: HOSPITAL | Age: 56
Discharge: HOME | End: 2021-01-10
Admitting: ORTHOPAEDIC SURGERY

## 2021-01-10 DIAGNOSIS — M54.50 LUMBAR BACK PAIN: ICD-10-CM

## 2021-01-10 LAB — CREAT BLDA-MCNC: 1.2 MG/DL (ref 0.6–1.3)

## 2021-01-10 PROCEDURE — 82565 ASSAY OF CREATININE: CPT

## 2021-01-10 PROCEDURE — A9577 INJ MULTIHANCE: HCPCS | Performed by: ORTHOPAEDIC SURGERY

## 2021-01-10 PROCEDURE — 0 GADOBENATE DIMEGLUMINE 529 MG/ML SOLUTION: Performed by: ORTHOPAEDIC SURGERY

## 2021-01-10 PROCEDURE — 72158 MRI LUMBAR SPINE W/O & W/DYE: CPT

## 2021-01-10 RX ADMIN — GADOBENATE DIMEGLUMINE 19 ML: 529 INJECTION, SOLUTION INTRAVENOUS at 10:00

## 2021-01-26 ENCOUNTER — IMMUNIZATION (OUTPATIENT)
Dept: VACCINE CLINIC | Facility: HOSPITAL | Age: 56
End: 2021-01-26

## 2021-01-26 PROCEDURE — 0002A: CPT | Performed by: INTERNAL MEDICINE

## 2021-01-26 PROCEDURE — 91300 HC SARSCOV02 VAC 30MCG/0.3ML IM: CPT | Performed by: INTERNAL MEDICINE

## 2021-03-23 DIAGNOSIS — M54.50 LUMBAR BACK PAIN: Primary | ICD-10-CM

## 2021-03-23 RX ORDER — METHYLPREDNISOLONE 4 MG/1
TABLET ORAL
Qty: 21 TABLET | Refills: 0 | Status: SHIPPED | OUTPATIENT
Start: 2021-03-23 | End: 2022-01-27

## 2021-03-23 NOTE — TELEPHONE ENCOUNTER
----- Message from Darvin Esteves MD sent at 3/23/2021  1:43 PM EDT -----  Tell him of course I will fill it.  ----- Message -----  From: Benjamin Gan MD  Sent: 3/23/2021  10:15 AM EDT  To: Darvin Esteves MD    I am going to be leaving on vacation at the end of this week. Is there any chance you can send a medrol dosepack to have in case I have problem?  ThanksRonald

## 2021-07-12 DIAGNOSIS — Z86.73 HISTORY OF CVA (CEREBROVASCULAR ACCIDENT): Primary | ICD-10-CM

## 2021-07-12 DIAGNOSIS — Z79.899 HIGH RISK MEDICATION USE: ICD-10-CM

## 2021-07-12 DIAGNOSIS — Z12.5 PROSTATE CANCER SCREENING: ICD-10-CM

## 2021-07-12 DIAGNOSIS — R73.9 HYPERGLYCEMIA: ICD-10-CM

## 2021-07-12 DIAGNOSIS — L60.3 NAIL DYSTROPHY: ICD-10-CM

## 2021-08-05 DIAGNOSIS — J06.9 UPPER RESPIRATORY TRACT INFECTION, UNSPECIFIED TYPE: Primary | ICD-10-CM

## 2021-08-06 LAB
LABCORP SARS-COV-2, NAA 2 DAY TAT: NORMAL
SARS-COV-2 RNA RESP QL NAA+PROBE: NOT DETECTED

## 2021-09-30 ENCOUNTER — DOCUMENTATION (OUTPATIENT)
Dept: GASTROENTEROLOGY | Facility: CLINIC | Age: 56
End: 2021-09-30

## 2021-09-30 DIAGNOSIS — R10.13 DYSPEPSIA: Primary | ICD-10-CM

## 2021-10-01 ENCOUNTER — LAB (OUTPATIENT)
Dept: LAB | Facility: HOSPITAL | Age: 56
End: 2021-10-01

## 2021-10-01 ENCOUNTER — DOCUMENTATION (OUTPATIENT)
Dept: FAMILY MEDICINE CLINIC | Facility: CLINIC | Age: 56
End: 2021-10-01

## 2021-10-01 DIAGNOSIS — R10.13 DYSPEPSIA: ICD-10-CM

## 2021-10-01 PROCEDURE — 36415 COLL VENOUS BLD VENIPUNCTURE: CPT

## 2021-10-01 PROCEDURE — 86677 HELICOBACTER PYLORI ANTIBODY: CPT

## 2021-10-01 RX ORDER — SUCRALFATE ORAL 1 G/10ML
1 SUSPENSION ORAL 4 TIMES DAILY
Qty: 1200 ML | Refills: 11 | Status: SHIPPED | OUTPATIENT
Start: 2021-10-01

## 2021-10-04 LAB
H PYLORI IGA SER-ACNC: <9 UNITS (ref 0–8.9)
H PYLORI IGG SER IA-ACNC: 0.27 INDEX VALUE (ref 0–0.79)
H PYLORI IGM SER-ACNC: <9 UNITS (ref 0–8.9)

## 2021-10-26 ENCOUNTER — IMMUNIZATION (OUTPATIENT)
Dept: VACCINE CLINIC | Facility: HOSPITAL | Age: 56
End: 2021-10-26

## 2021-10-26 PROCEDURE — 0004A ADM SARSCOV2 30MCG/0.3ML BOOSTER: CPT | Performed by: INTERNAL MEDICINE

## 2021-10-26 PROCEDURE — 91300 HC SARSCOV02 VAC 30MCG/0.3ML IM: CPT | Performed by: INTERNAL MEDICINE

## 2022-01-21 ENCOUNTER — PREP FOR SURGERY (OUTPATIENT)
Dept: SURGERY | Facility: SURGERY CENTER | Age: 57
End: 2022-01-21

## 2022-01-21 DIAGNOSIS — R10.13 DYSPEPSIA: Primary | ICD-10-CM

## 2022-01-24 ENCOUNTER — TELEPHONE (OUTPATIENT)
Dept: GASTROENTEROLOGY | Facility: CLINIC | Age: 57
End: 2022-01-24

## 2022-01-24 PROBLEM — R10.13 DYSPEPSIA: Status: ACTIVE | Noted: 2022-01-24

## 2022-01-24 NOTE — TELEPHONE ENCOUNTER
SPOKE WITH PATIENT.  SCHEDULED AT [facility] ON 02/01/2022 AT 12PM - ARRIVE 11AM.  E-MAIL INSTRUCTIONS [email or web address] TODAY.    COVID TEST ON 01/29/2022 AT [facility] AT 8AM.

## 2022-01-24 NOTE — TELEPHONE ENCOUNTER
----- Message from Mich Hampton MD sent at 1/21/2022  9:51 AM EST -----  Dr Gan need an EGD on a Tues for refractory dyspepsia- ill put in order at   ----- Message -----  From: Benjamin Gan MD  Sent: 1/21/2022   7:43 AM EST  To: Mich Hampton MD    Okay, I think I may be ready for that EGD. Things got a little better and even went away for a few weeks but now its back. Tuesdays are better days for me, but not this coming Tuesday because that is my birthday. I doubt you could get me on the schedule that quickly anyway.

## 2022-01-29 ENCOUNTER — LAB (OUTPATIENT)
Dept: LAB | Facility: SURGERY CENTER | Age: 57
End: 2022-01-29

## 2022-01-29 DIAGNOSIS — R10.13 DYSPEPSIA: ICD-10-CM

## 2022-01-29 LAB — SARS-COV-2 ORF1AB RESP QL NAA+PROBE: NOT DETECTED

## 2022-01-29 PROCEDURE — U0004 COV-19 TEST NON-CDC HGH THRU: HCPCS | Performed by: INTERNAL MEDICINE

## 2022-01-29 PROCEDURE — C9803 HOPD COVID-19 SPEC COLLECT: HCPCS

## 2022-02-01 ENCOUNTER — ANESTHESIA (OUTPATIENT)
Dept: SURGERY | Facility: SURGERY CENTER | Age: 57
End: 2022-02-01

## 2022-02-01 ENCOUNTER — HOSPITAL ENCOUNTER (OUTPATIENT)
Facility: SURGERY CENTER | Age: 57
Setting detail: HOSPITAL OUTPATIENT SURGERY
Discharge: HOME | End: 2022-02-01
Attending: INTERNAL MEDICINE | Admitting: INTERNAL MEDICINE

## 2022-02-01 ENCOUNTER — ANESTHESIA EVENT (OUTPATIENT)
Dept: SURGERY | Facility: SURGERY CENTER | Age: 57
End: 2022-02-01

## 2022-02-01 VITALS
RESPIRATION RATE: 18 BRPM | HEIGHT: 75 IN | SYSTOLIC BLOOD PRESSURE: 112 MMHG | BODY MASS INDEX: 25.99 KG/M2 | OXYGEN SATURATION: 99 % | DIASTOLIC BLOOD PRESSURE: 86 MMHG | TEMPERATURE: 97.1 F | HEART RATE: 60 BPM | WEIGHT: 209 LBS

## 2022-02-01 DIAGNOSIS — R10.13 DYSPEPSIA: ICD-10-CM

## 2022-02-01 PROCEDURE — 0DB98ZZ EXCISION OF DUODENUM, VIA NATURAL OR ARTIFICIAL OPENING ENDOSCOPIC: ICD-10-PCS | Performed by: INTERNAL MEDICINE

## 2022-02-01 PROCEDURE — 43239 EGD BIOPSY SINGLE/MULTIPLE: CPT | Performed by: INTERNAL MEDICINE

## 2022-02-01 PROCEDURE — 25010000002 PROPOFOL 10 MG/ML EMULSION: Performed by: ANESTHESIOLOGY

## 2022-02-01 PROCEDURE — 88305 TISSUE EXAM BY PATHOLOGIST: CPT | Performed by: INTERNAL MEDICINE

## 2022-02-01 RX ORDER — SODIUM CHLORIDE, SODIUM LACTATE, POTASSIUM CHLORIDE, CALCIUM CHLORIDE 600; 310; 30; 20 MG/100ML; MG/100ML; MG/100ML; MG/100ML
1000 INJECTION, SOLUTION INTRAVENOUS CONTINUOUS
Status: DISCONTINUED | OUTPATIENT
Start: 2022-02-01 | End: 2022-02-01 | Stop reason: HOSPADM

## 2022-02-01 RX ORDER — PROPOFOL 10 MG/ML
VIAL (ML) INTRAVENOUS AS NEEDED
Status: DISCONTINUED | OUTPATIENT
Start: 2022-02-01 | End: 2022-02-01 | Stop reason: SURG

## 2022-02-01 RX ORDER — ESOMEPRAZOLE MAGNESIUM 40 MG/1
40 CAPSULE, DELAYED RELEASE ORAL DAILY
Qty: 90 CAPSULE | Refills: 3 | Status: SHIPPED | OUTPATIENT
Start: 2022-02-01 | End: 2022-11-21 | Stop reason: SDUPTHER

## 2022-02-01 RX ORDER — SODIUM CHLORIDE 0.9 % (FLUSH) 0.9 %
10 SYRINGE (ML) INJECTION AS NEEDED
Status: DISCONTINUED | OUTPATIENT
Start: 2022-02-01 | End: 2022-02-01 | Stop reason: HOSPADM

## 2022-02-01 RX ORDER — LIDOCAINE HYDROCHLORIDE 20 MG/ML
INJECTION, SOLUTION INFILTRATION; PERINEURAL AS NEEDED
Status: DISCONTINUED | OUTPATIENT
Start: 2022-02-01 | End: 2022-02-01 | Stop reason: SURG

## 2022-02-01 RX ORDER — LIDOCAINE HYDROCHLORIDE 10 MG/ML
0.5 INJECTION, SOLUTION INFILTRATION; PERINEURAL ONCE AS NEEDED
Status: DISCONTINUED | OUTPATIENT
Start: 2022-02-01 | End: 2022-02-01 | Stop reason: HOSPADM

## 2022-02-01 RX ADMIN — SODIUM CHLORIDE, POTASSIUM CHLORIDE, SODIUM LACTATE AND CALCIUM CHLORIDE 1000 ML: 600; 310; 30; 20 INJECTION, SOLUTION INTRAVENOUS at 11:45

## 2022-02-01 RX ADMIN — PROPOFOL 100 MG: 10 INJECTION, EMULSION INTRAVENOUS at 12:08

## 2022-02-01 RX ADMIN — LIDOCAINE HYDROCHLORIDE 100 MG: 20 INJECTION, SOLUTION INFILTRATION; PERINEURAL at 12:08

## 2022-02-01 RX ADMIN — PROPOFOL INJECTABLE EMULSION 100 MCG/KG/MIN: 10 INJECTION, EMULSION INTRAVENOUS at 12:08

## 2022-02-01 NOTE — ANESTHESIA POSTPROCEDURE EVALUATION
Patient: Benjamin Gan Dr.    Procedure Summary     Date: 02/01/22 Room / Location: [facility] OR 05 / [facility] [place] MAIN OR    Anesthesia Start: 1206 Anesthesia Stop: 1227    Procedure: Esophagogastroduedenoscopy (N/A ) Diagnosis:       Dyspepsia      Gastritis      Reflux esophagitis      (Dyspepsia [R10.13])    Surgeons: Mich Hampton MD Provider: Clarence Chamberlain MD    Anesthesia Type: MAC ASA Status: 2          Anesthesia Type: MAC    Vitals  No vitals data found for the desired time range.          Post Anesthesia Care and Evaluation    Patient location during evaluation: PHASE II  Anesthetic complications: No anesthetic complications

## 2022-02-01 NOTE — BRIEF OP NOTE
ESOPHAGOGASTRODUODENOSCOPY  Progress Note    Benjamin Gan Dr.  2/1/2022    Pre-op Diagnosis:   Dyspepsia [R10.13]       Post-Op Diagnosis Codes:     * Dyspepsia [R10.13]     * Gastritis [K29.70]     * Reflux esophagitis [K21.00]    Procedure/CPT® Codes:        Procedure(s):  Esophagogastroduedenoscopy    Surgeon(s):  Mich Hampton MD    Anesthesia: Monitored Anesthesia Care    Staff:   Endo Technician: Mandie Mullen  Endo Nurse: Madonna Nagel RN         Estimated Blood Loss: none    Urine Voided: * No values recorded between 2/1/2022 12:07 PM and 2/1/2022 12:26 PM *    Specimens:                Specimens     ID Source Type Tests Collected By Collected At Frozen?    A Gastric, Antrum Tissue · TISSUE PATHOLOGY EXAM   Mich Hampton MD 2/1/22 1218 No    Description: random Gastric    This specimen was not marked as sent.    B Esophagus, Distal Tissue · TISSUE PATHOLOGY EXAM   Mich Hampton MD 2/1/22 1221 No    Description: distal esophagus biopsy    This specimen was not marked as sent.                Drains: * No LDAs found *    Findings: Duodenal Diverticulum  Moderate Diffuse Gastritis-Biopsy  Reflux Esophagitis-Biopsy    Complications: None          Mich Hampton MD     Date: 2/1/2022  Time: 12:25 EST

## 2022-02-01 NOTE — H&P
"Patient Care Team:  Benjmain Gan MD as PCP - General (Internal Medicine)    CHIEF COMPLAINT: dyspepsia    HISTORY OF PRESENT ILLNESS:  No dysphagia and does respond to PPI and Carafate but recurred    Past Medical History:   Diagnosis Date   • Flea bite    • History of colon polyps 11/26/2013    Cecum-Mild polypoid hyperplastic change; Transverse colon-Tubular adenoma w/ low grade dysplasia, multiple fragments of unremarkable colonic mucosa, few benign appearing lymphoid aggregates; Rectum-Hyperplastic polyp, fragments of unremarkable rectal mucosa, benign appearing lymphoid aggregates.   • Knee pain    • Malignant melanoma (HCC)    • Migraine     Only developed unilateral throbbing H/A after withdrawl from caffeine but denies spontaneous migraines   • Numbness    • Palpitations    • PFO (patent foramen ovale)    • Scotoma     \"classic\" fortification spectra, lasting 15min without H/A   • Screening PSA (prostate specific antigen)    • Stroke (HCC)    • TIA (transient ischemic attack)    • Urinary frequency      Past Surgical History:   Procedure Laterality Date   • CHOLECYSTECTOMY WITH INTRAOPERATIVE CHOLANGIOGRAM N/A 7/21/2019    Procedure: CHOLECYSTECTOMY LAPAROSCOPIC INTRAOPERATIVE CHOLANGIOGRAM;  Surgeon: Hollis Hernandez MD;  Location: [facility];  Service: General   • COLONOSCOPY N/A 11/26/2013    Ileum Normal, One 3 mm polyp Cecum, One 4 mm polyp Transverse colon, One 4 mm polyp in the rectum-Dr. Mich Hampton   • INGUINAL HERNIA REPAIR Right 05/25/2005    Dr. Cameron Carr   • KNEE ARTHROSCOPY W/ MENISCECTOMY Right 07/23/2015    Dr. Ck Fernandez   • PATENT FORAMEN OVALE CLOSURE N/A 08/22/2014    Dr. Clarence Tolbert   • SKIN CANCER EXCISION Right 05/17/2013    Wide excision melanoma in situ of scalp w/ complex closure-Dr. Jeremy Song   • UPPER GASTROINTESTINAL ENDOSCOPY N/A 03/15/2011    Duodenal diverticulum, Gastritis, LA Grade A reflux esophagitis-Dr. Mich Hampton     Family " "History   Problem Relation Age of Onset   • Hypertension Mother    • Hypertension Father    • Hypertension Sister    • Hypertension Brother    • Hyperlipidemia Other    • Neuropathy Other      Social History     Tobacco Use   • Smoking status: Never Smoker   • Smokeless tobacco: Never Used   Substance Use Topics   • Alcohol use: Yes     Comment: socially   • Drug use: No     Medications Prior to Admission   Medication Sig Dispense Refill Last Dose   • esomeprazole (nexIUM) 20 MG capsule Take 20 mg by mouth 2 (Two) Times a Day. 2 tabs   1/30/2022   • sucralfate (Carafate) 1 GM/10ML suspension Take 10 mL by mouth 4 (Four) Times a Day. 1200 mL 11 Past Week at Unknown time     Allergies:  Plavix [clopidogrel bisulfate]    REVIEW OF SYSTEMS:  Please see the above history of present illness for pertinent positives and negatives.  The remainder of the patient's systems have been reviewed and are negative.     Vital Signs  Temp:  [97.1 °F (36.2 °C)] 97.1 °F (36.2 °C)  Heart Rate:  [61] 61  Resp:  [20] 20  BP: (129-143)/() 129/87    Flowsheet Rows      First Filed Value   Admission Height 190.5 cm (75\") Documented at 01/27/2022 1248   Admission Weight 93 kg (205 lb) Documented at 01/27/2022 1248           Physical Exam:  Physical Exam   Constitutional: Patient appears well-developed and well-nourished and in no acute distress   HEENT:   Head: Normocephalic and atraumatic.   Eyes:  Pupils are equal, round, and reactive to light. EOM are intact. Sclerae are anicteric and non-injected.  Mouth and Throat: Patient has moist mucous membranes. Oropharynx is clear of any erythema or exudate.     Neck: Neck supple. No JVD present. No thyromegaly present. No lymphadenopathy present.  Cardiovascular: Regular rate, regular rhythm, S1 normal and S2 normal.  Exam reveals no gallop and no friction rub.  No murmur heard.  Pulmonary/Chest: Lungs are clear to auscultation bilaterally. No respiratory distress. No wheezes. No rhonchi. No " rales.   Abdominal: Soft. Bowel sounds are normal. No distension and no mass. There is no hepatosplenomegaly. There is no tenderness.   Musculoskeletal: Normal Muscle tone  Extremities: No edema. Pulses are palpable in all 4 extremities.  Neurological: Patient is alert and oriented to person, place, and time. Cranial nerves II-XII are grossly intact with no focal deficits.  Skin: Skin is warm. No rash noted. Nails show no clubbing.  No cyanosis or erythema.    Debilities/Disabilities Identified: None  Emotional Behavior: Appropriate     Results Review:   I reviewed the patient's new clinical results.    Lab Results (most recent)     None          Imaging Results (Most Recent)     None        reviewed    ECG/EMG Results (most recent)     None        reviewed    Assessment/Plan   dyspepsia/  EGD      I discussed the patient's findings and my recommendations with patient.     Mich Hampton MD  02/01/22  12:06 EST    Time: 10 min prior to procedure.

## 2022-02-01 NOTE — ANESTHESIA PREPROCEDURE EVALUATION
Anesthesia Evaluation     Patient summary reviewed and Nursing notes reviewed   no history of anesthetic complications:  NPO Solid Status: > 6 hours             Airway   Mallampati: III  TM distance: >3 FB  Neck ROM: full  Dental      Pulmonary - normal exam   (-) not a smoker  Cardiovascular - normal exam    (-) angina    ROS comment: H/O PFO Closure    Neuro/Psych  (+) TIA,     GI/Hepatic/Renal/Endo      ROS Comment: Dyspepsia    Musculoskeletal     Abdominal    Substance History      OB/GYN          Other                      Anesthesia Plan    ASA 2     MAC       Anesthetic plan, all risks, benefits, and alternatives have been provided, discussed and informed consent has been obtained with: patient.        CODE STATUS:

## 2022-02-02 LAB
LAB AP CASE REPORT: NORMAL
PATH REPORT.FINAL DX SPEC: NORMAL
PATH REPORT.GROSS SPEC: NORMAL

## 2022-11-21 RX ORDER — ESOMEPRAZOLE MAGNESIUM 40 MG/1
40 CAPSULE, DELAYED RELEASE ORAL DAILY
Qty: 90 CAPSULE | Refills: 3 | Status: SHIPPED | OUTPATIENT
Start: 2022-11-21

## 2023-01-06 ENCOUNTER — CLINICAL SUPPORT (OUTPATIENT)
Dept: FAMILY MEDICINE CLINIC | Facility: CLINIC | Age: 58
End: 2023-01-06
Payer: COMMERCIAL

## 2023-01-06 DIAGNOSIS — Z23 NEED FOR VACCINATION: Primary | ICD-10-CM

## 2023-01-06 PROCEDURE — 91312 COVID-19 (PFIZER) BIVALENT BOOSTER 12+YRS: CPT | Performed by: INTERNAL MEDICINE

## 2023-01-06 PROCEDURE — 0124A PR ADM SARSCOV2 30MCG/0.3ML BST: CPT | Performed by: INTERNAL MEDICINE

## 2023-08-25 ENCOUNTER — TELEPHONE (OUTPATIENT)
Dept: GASTROENTEROLOGY | Facility: CLINIC | Age: 58
End: 2023-08-25
Payer: COMMERCIAL

## 2023-08-25 DIAGNOSIS — Z86.010 PERSONAL HISTORY OF COLONIC POLYPS: Primary | ICD-10-CM

## 2023-08-25 NOTE — TELEPHONE ENCOUNTER
FAST TRACK - 5 YEAR RECALL 11/2023  LAST COLONOSCOPY 11/05/2018  PERSONAL HISTORY POLYPS  FAMILY HISTORY POLYPS, FATHER AGE 53  SCHEDULE AT [place].

## 2023-10-17 ENCOUNTER — CLINICAL SUPPORT (OUTPATIENT)
Dept: FAMILY MEDICINE CLINIC | Facility: CLINIC | Age: 58
End: 2023-10-17
Payer: COMMERCIAL

## 2023-10-17 DIAGNOSIS — Z23 IMMUNIZATION DUE: Primary | ICD-10-CM

## 2023-10-17 PROCEDURE — 90686 IIV4 VACC NO PRSV 0.5 ML IM: CPT | Performed by: INTERNAL MEDICINE

## 2023-10-17 PROCEDURE — 90471 IMMUNIZATION ADMIN: CPT | Performed by: INTERNAL MEDICINE

## 2023-10-23 PROBLEM — Z86.010 PERSONAL HISTORY OF COLONIC POLYPS: Status: ACTIVE | Noted: 2023-08-25

## 2023-10-23 PROBLEM — Z86.0100 PERSONAL HISTORY OF COLONIC POLYPS: Status: ACTIVE | Noted: 2023-08-25

## 2023-11-20 NOTE — SIGNIFICANT NOTE
Education provided the Patient on the following:    - Nothing to Eat or Drink after MN the night before the procedure    - Avoid red/purple fluids while completing their bowel prep as ordered by physician  -Contact Gastrointerologist office for any questions about specific details regarding colon prep    -You will need to have someone drive you home after your colonoscopy and remain with you for 24 hours after the procedure  - The date of your Surgery, you may have one visitor at bedside or within 10-15 minutes of [facility]  -Please wear warm socks when you arrive for your colonoscopy  -Remove all jewelry and leave any valuables before arriving the day of your procedure (all will have to be removed before leaving preop)  -You will need to arrive at 1430 on 11-21-23 for your colonoscopy    -Feel free to contact us at: [phone] with any additional questions/concerns

## 2023-11-21 ENCOUNTER — HOSPITAL ENCOUNTER (OUTPATIENT)
Facility: SURGERY CENTER | Age: 58
Setting detail: HOSPITAL OUTPATIENT SURGERY
Discharge: HOME | End: 2023-11-21
Attending: INTERNAL MEDICINE | Admitting: INTERNAL MEDICINE
Payer: COMMERCIAL

## 2023-11-21 ENCOUNTER — ANESTHESIA (OUTPATIENT)
Dept: SURGERY | Facility: SURGERY CENTER | Age: 58
End: 2023-11-21
Payer: COMMERCIAL

## 2023-11-21 ENCOUNTER — ANESTHESIA EVENT (OUTPATIENT)
Dept: SURGERY | Facility: SURGERY CENTER | Age: 58
End: 2023-11-21
Payer: COMMERCIAL

## 2023-11-21 VITALS
BODY MASS INDEX: 23.87 KG/M2 | SYSTOLIC BLOOD PRESSURE: 115 MMHG | HEART RATE: 63 BPM | DIASTOLIC BLOOD PRESSURE: 68 MMHG | HEIGHT: 75 IN | TEMPERATURE: 97.5 F | RESPIRATION RATE: 16 BRPM | OXYGEN SATURATION: 98 % | WEIGHT: 192 LBS

## 2023-11-21 DIAGNOSIS — Z86.010 PERSONAL HISTORY OF COLONIC POLYPS: ICD-10-CM

## 2023-11-21 PROCEDURE — 88305 TISSUE EXAM BY PATHOLOGIST: CPT | Performed by: INTERNAL MEDICINE

## 2023-11-21 PROCEDURE — 45380 COLONOSCOPY AND BIOPSY: CPT | Performed by: INTERNAL MEDICINE

## 2023-11-21 PROCEDURE — 25010000002 PROPOFOL 10 MG/ML EMULSION: Performed by: ANESTHESIOLOGY

## 2023-11-21 PROCEDURE — 25010000002 LIDOCAINE 1 % SOLUTION: Performed by: ANESTHESIOLOGY

## 2023-11-21 PROCEDURE — 25810000003 LACTATED RINGERS PER 1000 ML: Performed by: ANESTHESIOLOGY

## 2023-11-21 RX ORDER — ONDANSETRON 2 MG/ML
4 INJECTION INTRAMUSCULAR; INTRAVENOUS ONCE AS NEEDED
Status: DISCONTINUED | OUTPATIENT
Start: 2023-11-21 | End: 2023-11-21 | Stop reason: HOSPADM

## 2023-11-21 RX ORDER — SODIUM CHLORIDE, SODIUM LACTATE, POTASSIUM CHLORIDE, CALCIUM CHLORIDE 600; 310; 30; 20 MG/100ML; MG/100ML; MG/100ML; MG/100ML
INJECTION, SOLUTION INTRAVENOUS CONTINUOUS PRN
Status: DISCONTINUED | OUTPATIENT
Start: 2023-11-21 | End: 2023-11-21 | Stop reason: SURG

## 2023-11-21 RX ORDER — LIDOCAINE HYDROCHLORIDE 10 MG/ML
INJECTION, SOLUTION INFILTRATION; PERINEURAL AS NEEDED
Status: DISCONTINUED | OUTPATIENT
Start: 2023-11-21 | End: 2023-11-21 | Stop reason: SURG

## 2023-11-21 RX ORDER — FENTANYL CITRATE 50 UG/ML
25 INJECTION, SOLUTION INTRAMUSCULAR; INTRAVENOUS
Status: DISCONTINUED | OUTPATIENT
Start: 2023-11-21 | End: 2023-11-21 | Stop reason: HOSPADM

## 2023-11-21 RX ADMIN — SODIUM CHLORIDE, SODIUM LACTATE, POTASSIUM CHLORIDE, AND CALCIUM CHLORIDE: .6; .31; .03; .02 INJECTION, SOLUTION INTRAVENOUS at 14:40

## 2023-11-21 RX ADMIN — LIDOCAINE HYDROCHLORIDE 30 MG: 10 INJECTION, SOLUTION INFILTRATION; PERINEURAL at 14:41

## 2023-11-21 RX ADMIN — PROPOFOL 200 MCG/KG/MIN: 10 INJECTION, EMULSION INTRAVENOUS at 14:43

## 2023-11-21 NOTE — ANESTHESIA PREPROCEDURE EVALUATION
Anesthesia Evaluation     NPO Solid Status: > 8 hours             Airway   Mallampati: I  TM distance: >3 FB  Neck ROM: full  Dental - normal exam     Pulmonary - normal exam   Cardiovascular - normal exam      ROS comment: S/p pfo closure    Neuro/Psych  (+) TIA  GI/Hepatic/Renal/Endo      Musculoskeletal     Abdominal    Substance History      OB/GYN          Other                    Anesthesia Plan    ASA 3     MAC       Anesthetic plan, risks, benefits, and alternatives have been provided, discussed and informed consent has been obtained with: patient.    CODE STATUS:

## 2023-11-21 NOTE — ANESTHESIA POSTPROCEDURE EVALUATION
Patient: Benjamin Gan Dr.    Procedure Summary       Date: 11/21/23 Room / Location: [facility]  / [facility]    Anesthesia Start: 1440 Anesthesia Stop: 1508    Procedure: COLONOSCOPY to Cecum with Polypectomy Diagnosis:       Personal history of colonic polyps      Diverticulosis      Colon polyp      (Personal history of colonic polyps [Z86.010])    Surgeons: Mich Hampton MD Provider: Julio Castañeda MD    Anesthesia Type: MAC ASA Status: 3            Anesthesia Type: MAC    Vitals  Vitals Value Taken Time   /66 11/21/23 1509   Temp 36.4 °C (97.5 °F) 11/21/23 1509   Pulse 58 11/21/23 1509   Resp 16 11/21/23 1509   SpO2 98 % 11/21/23 1509           Post Anesthesia Care and Evaluation    Patient location during evaluation: bedside  Pain management: adequate    Airway patency: patent  Anesthetic complications: No anesthetic complications    Cardiovascular status: acceptable  Respiratory status: acceptable  Hydration status: acceptable

## 2023-11-21 NOTE — BRIEF OP NOTE
COLONOSCOPY  Progress Note    Benjamin Gan Dr.  11/21/2023    Pre-op Diagnosis:   Personal history of colonic polyps [Z86.010]       Post-Op Diagnosis Codes:     * Personal history of colonic polyps [Z86.010]     * Diverticulosis [K57.90]     * Colon polyp [K63.5]    Procedure/CPT® Codes:        Procedure(s):  COLONOSCOPY to Cecum with Polypectomy              Surgeon(s):  Mich Hampton MD    Anesthesia: Monitored Anesthesia Care    Staff:   Endo Technician: Shiar Potter  Endo Nurse: Madonna Nagel RN         Estimated Blood Loss: none    Urine Voided: * No values recorded between 11/21/2023  2:41 PM and 11/21/2023  3:07 PM *    Specimens:                Specimens       ID Source Type Tests Collected By Collected At Frozen?    A Large Intestine, Transverse Colon Tissue TISSUE PATHOLOGY EXAM   Mich Hampton MD 11/21/23 8449 No    Description: Transverse Polyp    B Large Intestine, Rectum Tissue TISSUE PATHOLOGY EXAM   Mich Hampton MD 11/21/23 1898 No    Description: rectal Polyp                  Drains: * No LDAs found *    Findings: Colon to TI good Prep  Pan-Colonic Diverticulosis  [id number]        Complications: None          Mich Hampton MD     Date: 11/21/2023  Time: 15:08 EST

## 2023-11-22 LAB
LAB AP CASE REPORT: NORMAL
PATH REPORT.FINAL DX SPEC: NORMAL
PATH REPORT.GROSS SPEC: NORMAL

## 2023-12-14 RX ORDER — ESOMEPRAZOLE MAGNESIUM 40 MG/1
40 CAPSULE, DELAYED RELEASE ORAL DAILY
Qty: 90 CAPSULE | Refills: 3 | OUTPATIENT
Start: 2023-12-14

## 2023-12-19 DIAGNOSIS — R10.13 DYSPEPSIA: Primary | ICD-10-CM

## 2023-12-19 RX ORDER — ESOMEPRAZOLE MAGNESIUM 40 MG/1
40 CAPSULE, DELAYED RELEASE ORAL DAILY
Qty: 90 CAPSULE | Refills: 3 | Status: SHIPPED | OUTPATIENT
Start: 2023-12-19

## 2024-10-15 ENCOUNTER — CLINICAL SUPPORT (OUTPATIENT)
Dept: FAMILY MEDICINE CLINIC | Facility: CLINIC | Age: 59
End: 2024-10-15
Payer: COMMERCIAL

## 2024-10-15 DIAGNOSIS — Z23 IMMUNIZATION DUE: Primary | ICD-10-CM

## 2024-10-15 PROCEDURE — 90471 IMMUNIZATION ADMIN: CPT | Performed by: INTERNAL MEDICINE

## 2024-10-15 PROCEDURE — 90656 IIV3 VACC NO PRSV 0.5 ML IM: CPT | Performed by: INTERNAL MEDICINE

## 2024-10-15 RX ORDER — MONTELUKAST SODIUM 4 MG/1
2 TABLET, CHEWABLE ORAL NIGHTLY
Qty: 60 TABLET | Refills: 11 | Status: SHIPPED | OUTPATIENT
Start: 2024-10-15

## 2025-01-06 DIAGNOSIS — R10.13 DYSPEPSIA: ICD-10-CM

## 2025-01-07 RX ORDER — ESOMEPRAZOLE MAGNESIUM 40 MG/1
40 CAPSULE, DELAYED RELEASE ORAL DAILY
Qty: 90 CAPSULE | Refills: 3 | OUTPATIENT
Start: 2025-01-07

## 2025-01-08 DIAGNOSIS — R10.13 DYSPEPSIA: ICD-10-CM

## 2025-01-08 RX ORDER — ESOMEPRAZOLE MAGNESIUM 40 MG/1
40 CAPSULE, DELAYED RELEASE ORAL DAILY
Qty: 90 CAPSULE | Refills: 3 | Status: SHIPPED | OUTPATIENT
Start: 2025-01-08 | End: 2025-01-08 | Stop reason: SDUPTHER

## 2025-01-08 RX ORDER — ESOMEPRAZOLE MAGNESIUM 40 MG/1
40 CAPSULE, DELAYED RELEASE ORAL DAILY
Qty: 90 CAPSULE | Refills: 0 | Status: SHIPPED | OUTPATIENT
Start: 2025-01-08 | End: 2025-01-08 | Stop reason: SDUPTHER

## 2025-01-08 RX ORDER — ESOMEPRAZOLE MAGNESIUM 40 MG/1
40 CAPSULE, DELAYED RELEASE ORAL DAILY
Qty: 90 CAPSULE | Refills: 3 | Status: SHIPPED | OUTPATIENT
Start: 2025-01-08

## 2025-03-11 DIAGNOSIS — R10.13 DYSPEPSIA: ICD-10-CM

## 2025-03-11 RX ORDER — ESOMEPRAZOLE MAGNESIUM 40 MG/1
40 CAPSULE, DELAYED RELEASE ORAL DAILY
Qty: 90 CAPSULE | Refills: 3 | Status: SHIPPED | OUTPATIENT
Start: 2025-03-11

## 2025-03-11 RX ORDER — COLESTIPOL HYDROCHLORIDE 1 G/1
2 TABLET ORAL NIGHTLY
Qty: 180 TABLET | Refills: 3 | Status: SHIPPED | OUTPATIENT
Start: 2025-03-11

## (undated) DEVICE — APPL CHLORAPREP W/TINT 26ML ORNG

## (undated) DEVICE — GOWN ISOL W/THUMB UNIV BLU BX/15

## (undated) DEVICE — CATH CHOLANG 4.5F18IN BRGNDY

## (undated) DEVICE — SOL NACL 0.9PCT 1000ML

## (undated) DEVICE — BITEBLOCK OMNI BLOC

## (undated) DEVICE — SINGLE-USE BIOPSY FORCEPS: Brand: RADIAL JAW 4

## (undated) DEVICE — VIAL FORMLN CAP 10PCT 20ML

## (undated) DEVICE — DRAPE,REIN 53X77,STERILE: Brand: MEDLINE

## (undated) DEVICE — STPCK 3WY D201 DISCOFIX

## (undated) DEVICE — FLEX ADVANTAGE 1500CC: Brand: FLEX ADVANTAGE

## (undated) DEVICE — GLV SURG BIOGEL LTX PF 7 1/2

## (undated) DEVICE — Device

## (undated) DEVICE — VISUALIZATION SYSTEM: Brand: CLEARIFY

## (undated) DEVICE — STPLR SKIN VISISTAT WD 35CT

## (undated) DEVICE — ENDOPATH XCEL BLADELESS TROCARS WITH STABILITY SLEEVES: Brand: ENDOPATH XCEL

## (undated) DEVICE — LOU LAP CHOLE: Brand: MEDLINE INDUSTRIES, INC.

## (undated) DEVICE — TROCAR SITE CLOSURE DEVICE: Brand: ENDO CLOSE

## (undated) DEVICE — KT ORCA ORCAPOD DISP STRL

## (undated) DEVICE — ADAPT CLN SCPE ENDO PORPOISE BX/50 DISP

## (undated) DEVICE — CANN NASL CO2 TRULINK W/O2 A/

## (undated) DEVICE — ADHS SKIN DERMABOND TOP ADVANCED

## (undated) DEVICE — JACKT LAB F/R KNIT CUFF/COLR XLG BLU

## (undated) DEVICE — ENDOPATH XCEL UNIVERSAL TROCAR STABLILITY SLEEVES: Brand: ENDOPATH XCEL

## (undated) DEVICE — CATH IV INSYTE AUTOGARD 14G 1 1/2IN ORNG

## (undated) DEVICE — EXTENSION SET, MALE LUER LOCK ADAPTER WITH RETRACTABLE COLLAR

## (undated) DEVICE — ENDOCUT SCISSOR TIP, DISPOSABLE: Brand: RENEW

## (undated) DEVICE — APPL HEMO SURG ARISTA/AH/FLEXITIP XL 38CM

## (undated) DEVICE — DRP C/ARM 41X74IN

## (undated) DEVICE — CANN O2 ETCO2 FITS ALL CONN CO2 SMPL A/ 7IN DISP LF

## (undated) DEVICE — SUT VIC 0/0 UR6 27IN DYED J603H

## (undated) DEVICE — SUT MNCRYL PLS ANTIB UD 4/0 PS2 18IN

## (undated) DEVICE — ENDOPOUCH RETRIEVER SPECIMEN RETRIEVAL BAGS: Brand: ENDOPOUCH RETRIEVER

## (undated) DEVICE — SYRINGE, LUER SLIP, STERILE, 60ML: Brand: MEDLINE